# Patient Record
Sex: FEMALE | Race: WHITE | NOT HISPANIC OR LATINO | Employment: OTHER | URBAN - METROPOLITAN AREA
[De-identification: names, ages, dates, MRNs, and addresses within clinical notes are randomized per-mention and may not be internally consistent; named-entity substitution may affect disease eponyms.]

---

## 2017-04-25 ENCOUNTER — OFFICE VISIT (OUTPATIENT)
Dept: PULMONOLOGY | Facility: CLINIC | Age: 82
End: 2017-04-25

## 2017-04-25 VITALS
RESPIRATION RATE: 12 BRPM | HEART RATE: 89 BPM | TEMPERATURE: 97.5 F | OXYGEN SATURATION: 97 % | WEIGHT: 155 LBS | BODY MASS INDEX: 27.46 KG/M2 | SYSTOLIC BLOOD PRESSURE: 122 MMHG | DIASTOLIC BLOOD PRESSURE: 72 MMHG | HEIGHT: 63 IN

## 2017-04-25 DIAGNOSIS — K21.9 GASTROESOPHAGEAL REFLUX DISEASE, ESOPHAGITIS PRESENCE NOT SPECIFIED: ICD-10-CM

## 2017-04-25 DIAGNOSIS — J47.9 BRONCHIECTASIS WITHOUT COMPLICATION (HCC): Primary | ICD-10-CM

## 2017-04-25 DIAGNOSIS — Z87.01 H/O: PNEUMONIA: ICD-10-CM

## 2017-04-25 PROCEDURE — 94010 BREATHING CAPACITY TEST: CPT | Performed by: NURSE PRACTITIONER

## 2017-04-25 PROCEDURE — 99214 OFFICE O/P EST MOD 30 MIN: CPT | Performed by: NURSE PRACTITIONER

## 2017-04-25 RX ORDER — LATANOPROST 50 UG/ML
1 SOLUTION/ DROPS OPHTHALMIC NIGHTLY
COMMUNITY
End: 2017-12-11

## 2017-05-04 PROBLEM — K21.9 GERD (GASTROESOPHAGEAL REFLUX DISEASE): Status: ACTIVE | Noted: 2017-05-04

## 2017-12-11 ENCOUNTER — OFFICE VISIT (OUTPATIENT)
Dept: PULMONOLOGY | Facility: CLINIC | Age: 82
End: 2017-12-11

## 2017-12-11 VITALS
HEIGHT: 63 IN | WEIGHT: 155 LBS | TEMPERATURE: 97.6 F | BODY MASS INDEX: 27.46 KG/M2 | DIASTOLIC BLOOD PRESSURE: 80 MMHG | SYSTOLIC BLOOD PRESSURE: 142 MMHG | OXYGEN SATURATION: 94 % | HEART RATE: 78 BPM

## 2017-12-11 DIAGNOSIS — J47.9 BRONCHIECTASIS WITHOUT COMPLICATION (HCC): Primary | ICD-10-CM

## 2017-12-11 PROCEDURE — 94010 BREATHING CAPACITY TEST: CPT | Performed by: NURSE PRACTITIONER

## 2017-12-11 PROCEDURE — 99214 OFFICE O/P EST MOD 30 MIN: CPT | Performed by: NURSE PRACTITIONER

## 2017-12-11 RX ORDER — BRINZOLAMIDE 1 %
SUSPENSION, DROPS(FINAL DOSAGE FORM)(ML) OPHTHALMIC (EYE)
COMMUNITY
Start: 2017-10-17

## 2017-12-11 RX ORDER — DILTIAZEM HYDROCHLORIDE 120 MG/1
CAPSULE, EXTENDED RELEASE ORAL
COMMUNITY
Start: 2017-10-14 | End: 2017-12-11 | Stop reason: SDUPTHER

## 2017-12-11 RX ORDER — MIRTAZAPINE 45 MG/1
TABLET, FILM COATED ORAL
COMMUNITY
Start: 2017-11-16

## 2017-12-11 NOTE — PROGRESS NOTES
[facility]    CHIEF COMPLAINT    Bronchiectasis  Lifelong nonsmoker  GERD    HISTORY OF PRESENT ILLNESS    Anali Hensley is a 82 y.o.female here today for follow up. I last saw her in the office 4/2017.  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      She's been very resistant to any type of inhalation therapy in the past; mostly due to her h/o glaucoma.  She has alwyas refused immunizations. She has done well overall, remaining active with no recent antibiotic use.     She has done well in the past using doxycycline a couple times a year for any increasing cough or sputum production.    She uses doxycycline a few months ago, she has had no hospitalization or emergency room visit.  She is requesting another prescription.  Currently she denies much of a cough or sputum production and has no wheezing or exertional dyspnea. She has remained active. She has mild MORTENSEN which is unchanged.     She denies indigestion or dysphagia and does have a history of reflux.    She has not had any fevers chills or night sweats, her weight is been stable and she has not required hospitalization or emergency room visit.  No chest pains.      Patient Active Problem List   Diagnosis   • Arthritis   • Bronchiectasis   • Depression   • Diabetes mellitus   • Hypertension   • Obesity   • H/O: pneumonia   • GERD (gastroesophageal reflux disease)       Allergies   Allergen Reactions   • Penicillins    • Stevia [Stevioside]      PACK       Current Outpatient Prescriptions:   •  AZOPT 1 % ophthalmic suspension, , Disp: , Rfl:   •  bimatoprost (LUMIGAN) 0.01 % ophthalmic drops, Apply  to eye., Disp: , Rfl:   •  diltiazem SR (CARDIZEM SR) 120 MG 12 hr capsule, Take  by mouth daily., Disp: , Rfl:   •  LORazepam (ATIVAN) 1 MG tablet, Take  by mouth., Disp: , Rfl:   •  mirtazapine (REMERON) 45 MG tablet, , Disp: , Rfl:   •  Probiotic Product (PROBIOTIC DAILY  "PO), Take  by mouth., Disp: , Rfl:   MEDICATION LIST AND ALLERGIES REVIEWED.    Social History   Substance Use Topics   • Smoking status: Never Smoker   • Smokeless tobacco: None   • Alcohol use No       FAMILY AND SOCIAL HISTORY REVIEWED.    Review of Systems   Constitutional: Negative for activity change, chills, fatigue and fever.   HENT: Positive for postnasal drip. Negative for hearing loss, nosebleeds and sneezing.    Respiratory: Positive for shortness of breath. Negative for apnea, cough, choking, chest tightness, wheezing and stridor.    Cardiovascular: Negative for chest pain, palpitations and leg swelling.        NO INCREASE IN MILD BLE EDEMA; NO CALF TENDERNESS    Gastrointestinal: Negative for abdominal pain, diarrhea and nausea.   Neurological: Negative for dizziness, syncope and light-headedness.   .    /80 (BP Location: Left arm, Patient Position: Sitting, Cuff Size: Adult)  Pulse 78  Temp 97.6 °F (36.4 °C)  Ht 160 cm (62.99\")  Wt 70.3 kg (155 lb)  SpO2 94%  BMI 27.46 kg/m2  Physical Exam   Constitutional: She is oriented to person, place, and time. She appears well-developed. No distress.   HENT:   Head: Normocephalic.   Eyes: Pupils are equal, round, and reactive to light.   Neck: Normal range of motion. No JVD present. No thyromegaly present.   Cardiovascular: Normal rate, regular rhythm and normal heart sounds.  Exam reveals no friction rub.    Mild BLE edema; no venous cords or calf tenderness.    Pulmonary/Chest: Effort normal. No stridor. No respiratory distress. She has no wheezes. She has no rales. She exhibits no tenderness.   Lymphadenopathy:     She has no cervical adenopathy.   Neurological: She is alert and oriented to person, place, and time.   Skin: She is not diaphoretic.   Psychiatric: She has a normal mood and affect. Her behavior is normal. Thought content normal.       RESULTS    No obstruction; MVV normal, restrictive pattern; similar to priors    Chest x-ray PA and " lateral obtained in the office today reveals no acute infiltrates; she has a prominent interstitial pattern as before; with no significant change from prior films; compared to her film from 2014       PROBLEM LIST    Bronchiectasis    GERD    Lifelong nonsmoker    History of bronchitis        DISCUSSION    We did review GERD precautions, to avoid exacerbations, she seems to be doing well with this.      She has a perception for doxycycline on hand in case she needs to use it prior to her next visit.    Otherwise she'll follow-up in 6 months,The patient was told to call and given extensions 132, 112, 104 if needed for an earlier visit if problems arise prior to the scheduled followup.     She was encouraged again to at least get the flu vaccination but she declined, she was told to call us if she changes her mind, as if she does get the flu I cautioned that could be pretty serious causing hospitalization pneumonia etc.    Inga Rouse, APRN  12/11/20172:20 PM  Electronically signed     Please note that portions of this note were completed with a voice recognition program. Efforts were made to edit the dictations, but occasionally words are mistranscribed.      CC: Sagar Mesa MD

## 2018-09-17 ENCOUNTER — OFFICE VISIT (OUTPATIENT)
Dept: PULMONOLOGY | Facility: CLINIC | Age: 83
End: 2018-09-17

## 2018-09-17 VITALS
DIASTOLIC BLOOD PRESSURE: 80 MMHG | HEIGHT: 63 IN | OXYGEN SATURATION: 96 % | WEIGHT: 164 LBS | SYSTOLIC BLOOD PRESSURE: 132 MMHG | HEART RATE: 86 BPM | BODY MASS INDEX: 29.06 KG/M2 | TEMPERATURE: 98.6 F

## 2018-09-17 DIAGNOSIS — J47.9 BRONCHIECTASIS WITHOUT COMPLICATION (HCC): Primary | ICD-10-CM

## 2018-09-17 PROCEDURE — 99213 OFFICE O/P EST LOW 20 MIN: CPT | Performed by: NURSE PRACTITIONER

## 2018-09-17 RX ORDER — DOXYCYCLINE HYCLATE 100 MG/1
100 CAPSULE ORAL 2 TIMES DAILY
Qty: 20 CAPSULE | Refills: 0 | Status: SHIPPED | OUTPATIENT
Start: 2018-09-17 | End: 2019-05-02

## 2018-09-17 NOTE — PROGRESS NOTES
[facility]    CHIEF COMPLAINT    Bronchiectasis    HISTORY OF PRESENT ILLNESS    Anali Hensley is a 83 y.o.female lifetime nonsmoker with bronchiectasis here today for follow-up.    She last saw LC Trevino in December 2017.  She has not had to take any antibiotics since then.  She did develop a productive cough that lasted for about 2 weeks but resolved with flutter valve use.    She has a history of GERD but reports this has been well controlled with no dyspepsia or dysphagia.    She is always refused immunizations in the past as she had a family member that passed away after receiving the flu vaccine.  She has also been hesitant to try any inhaled therapy.      Patient Active Problem List   Diagnosis   • Arthritis   • Bronchiectasis (CMS/HCC)   • Depression   • Diabetes mellitus (CMS/HCC)   • Hypertension   • Obesity   • H/O: pneumonia   • GERD (gastroesophageal reflux disease)       Allergies   Allergen Reactions   • Penicillins    • Stevia [Stevioside]      PACK       Current Outpatient Prescriptions:   •  AZOPT 1 % ophthalmic suspension, , Disp: , Rfl:   •  bimatoprost (LUMIGAN) 0.01 % ophthalmic drops, Apply  to eye., Disp: , Rfl:   •  diltiazem SR (CARDIZEM SR) 120 MG 12 hr capsule, Take  by mouth daily., Disp: , Rfl:   •  LORazepam (ATIVAN) 1 MG tablet, Take  by mouth., Disp: , Rfl:   •  mirtazapine (REMERON) 45 MG tablet, , Disp: , Rfl:   •  Probiotic Product (PROBIOTIC DAILY PO), Take  by mouth., Disp: , Rfl:   •  doxycycline (VIBRAMYCIN) 100 MG capsule, Take 1 capsule by mouth 2 (Two) Times a Day., Disp: 20 capsule, Rfl: 0  MEDICATION LIST AND ALLERGIES REVIEWED.    Social History   Substance Use Topics   • Smoking status: Never Smoker   • Smokeless tobacco: Not on file   • Alcohol use No       FAMILY AND SOCIAL HISTORY REVIEWED.    Review of Systems   Constitutional: Negative.    HENT: Negative.    Eyes: Negative.    Respiratory: Positive for cough.    Cardiovascular: Negative.   "  Gastrointestinal: Negative.    Musculoskeletal: Positive for arthralgias and gait problem.   Skin: Negative.    .    /80   Pulse 86   Temp 98.6 °F (37 °C)   Ht 160 cm (63\")   Wt 74.4 kg (164 lb)   SpO2 96% Comment: RA @ rest  BMI 29.05 kg/m²       There is no immunization history on file for this patient.    Physical Exam   Constitutional: She is oriented to person, place, and time. She appears well-developed.   HENT:   Head: Normocephalic and atraumatic.   Neck: Neck supple.   Cardiovascular: Normal rate and regular rhythm.    No murmur heard.  Pulmonary/Chest: Effort normal and breath sounds normal. No stridor. No respiratory distress. She has no wheezes. She has no rales.   Musculoskeletal:   Ambulating with cane   Lymphadenopathy:     She has no cervical adenopathy.   Neurological: She is alert and oriented to person, place, and time.   Skin: Skin is warm and dry.   Psychiatric: She has a normal mood and affect. Her behavior is normal.   Vitals reviewed.        RESULTS    Chest x-ray PA and lateral obtained in the office today reveals no acute infiltrates; she has a prominent interstitial pattern as before; with no significant change from prior films; compared to her film from Dec 2017.       PROBLEM LIST    Problem List Items Addressed This Visit        Respiratory    Bronchiectasis (CMS/HCC) - Primary    Overview     Prior CT scans of the chest (was referred in 1992 to our office; saw Dr. Sal for      this reason.) showing a patchy infiltrate in the right middle lobe which resembled a      masslike consolidation.Improved after abx, with repeat CT in [facility]/ followed in      1993 by her PCP; she was to be referred back for a bronch if      the cough/sputum persisted.         Relevant Orders    XR Chest PA & Lateral            DISCUSSION    She has done well from a pulmonary standpoint with no need for antibiotics over the last year.  She does like to have a prescription for doxycycline " on hand in the event of exacerbation.  I have provided her with a written one today.  I encouraged flutter valve use as needed for mucociliary clearance.  She is not interested in any immunizations today.    She'll follow up in 9 months or sooner if needed.    I spent 15 minutes with the patient. I spent > 50% percent of this time counseling and discussing diagnosis, diagnostic testing and current status.    Zulma Don, LC  09/17/201812:30 PM  Electronically signed     Please note that portions of this note were completed with a voice recognition program. Efforts were made to edit the dictations, but occasionally words are mistranscribed.      CC: Sagar Mesa MD

## 2019-04-23 ENCOUNTER — DOCUMENTATION (OUTPATIENT)
Dept: PULMONOLOGY | Facility: CLINIC | Age: 84
End: 2019-04-23

## 2019-04-23 NOTE — PROGRESS NOTES
Pt called requesting to schedule an apt due to having productive cough w/ sputum for about 2 weeks. Pt denies any chest pain/nausea/fever. Pt last seen on 9/17/2019. Due to no openings this week, pt advised to contact PCP for further evaluation and we can put her on the waiting list to be called. Pt verbalized understanding.

## 2019-05-02 ENCOUNTER — OFFICE VISIT (OUTPATIENT)
Dept: PULMONOLOGY | Facility: CLINIC | Age: 84
End: 2019-05-02

## 2019-05-02 VITALS
SYSTOLIC BLOOD PRESSURE: 160 MMHG | TEMPERATURE: 98.2 F | WEIGHT: 171.4 LBS | OXYGEN SATURATION: 97 % | BODY MASS INDEX: 30.37 KG/M2 | DIASTOLIC BLOOD PRESSURE: 90 MMHG | HEART RATE: 81 BPM | HEIGHT: 63 IN

## 2019-05-02 DIAGNOSIS — J47.9 BRONCHIECTASIS WITHOUT COMPLICATION (HCC): Primary | ICD-10-CM

## 2019-05-02 DIAGNOSIS — K21.9 GASTROESOPHAGEAL REFLUX DISEASE, ESOPHAGITIS PRESENCE NOT SPECIFIED: ICD-10-CM

## 2019-05-02 PROCEDURE — 99213 OFFICE O/P EST LOW 20 MIN: CPT | Performed by: NURSE PRACTITIONER

## 2019-05-02 RX ORDER — DILTIAZEM HYDROCHLORIDE 120 MG/1
TABLET, FILM COATED ORAL
COMMUNITY
Start: 2006-05-02

## 2019-05-02 RX ORDER — DOXYCYCLINE HYCLATE 100 MG/1
100 CAPSULE ORAL 2 TIMES DAILY
Qty: 20 CAPSULE | Refills: 0 | Status: SHIPPED | OUTPATIENT
Start: 2019-05-02

## 2019-05-02 NOTE — PROGRESS NOTES
[facility]    CHIEF COMPLAINT    Cough and congestion    Subjective   HISTORY OF PRESENT ILLNESS    Anali Hensley is a 83 y.o.female here today for routine follow-up on her bronchiectasis she was last seen in the office in September 2018 by Ms. Don prior to that she been followed by Inga Rouse.  It appears over the last few year she has had a rare episode of exacerbation.    She does have a history of a restrictive deficit secondary to her bronchiectasis as well as to some kyphosis.  Her last PFTs were in December 2017 with an FVC of 67%.    Routinely she uses a flutter valve as needed to help with secretion clearance.  She does not use any nebulizers or inhalers.    She has been having some worsening cough and congestion the last 2 weeks.  She is having a thick yellow-tinged sputum.  She denies any fevers or chills.  She is having some wheezing but no significant shortness of air.    She also cares for her ill  at home, she is his primary care provider.      Patient Active Problem List   Diagnosis   • Arthritis   • Bronchiectasis (CMS/[facility])   • Depression   • Diabetes mellitus (CMS/[facility])   • Hypertension   • Obesity   • H/O: pneumonia   • GERD (gastroesophageal reflux disease)       Allergies   Allergen Reactions   • Penicillins Swelling   • Stevia [Stevioside] Swelling     PACK       Current Outpatient Medications:   •  AZOPT 1 % ophthalmic suspension, , Disp: , Rfl:   •  bimatoprost (LUMIGAN) 0.01 % ophthalmic drops, Apply  to eye., Disp: , Rfl:   •  diltiaZEM (CARDIZEM) 120 MG tablet, Take one by mouth per day, Disp: , Rfl:   •  diltiazem SR (CARDIZEM SR) 120 MG 12 hr capsule, Take  by mouth daily., Disp: , Rfl:   •  LORazepam (ATIVAN) 1 MG tablet, Take  by mouth., Disp: , Rfl:   •  mirtazapine (REMERON) 45 MG tablet, , Disp: , Rfl:   •  Probiotic Product (PROBIOTIC DAILY PO), Take  by mouth., Disp: , Rfl:   •  doxycycline (VIBRAMYCIN) 100 MG capsule, Take 1 capsule by mouth 2 (Two)  "Times a Day., Disp: 20 capsule, Rfl: 0  MEDICATION LIST AND ALLERGIES REVIEWED.    Social History     Tobacco Use   • Smoking status: Never Smoker   Substance Use Topics   • Alcohol use: No   • Drug use: No       FAMILY AND SOCIAL HISTORY REVIEWED.    Review of Systems   Constitutional: Positive for fatigue. Negative for chills, fever and unexpected weight change.   HENT: Positive for congestion. Negative for nosebleeds, postnasal drip, rhinorrhea, sinus pressure and trouble swallowing.    Respiratory: Positive for cough and wheezing. Negative for chest tightness and shortness of breath.    Cardiovascular: Negative for chest pain and leg swelling.   Gastrointestinal: Negative for abdominal pain, constipation, diarrhea, nausea and vomiting.   Genitourinary: Negative for dysuria, frequency, hematuria and urgency.   Musculoskeletal: Negative for myalgias.   Neurological: Negative for dizziness, weakness, numbness and headaches.   All other systems reviewed and are negative.  .  Objective   /90 (BP Location: Right arm, Patient Position: Sitting, Cuff Size: Adult)   Pulse 81   Temp 98.2 °F (36.8 °C)   Ht 160 cm (63\")   Wt 77.7 kg (171 lb 6.4 oz)   SpO2 97% Comment: resting at room air  BMI 30.36 kg/m²       There is no immunization history on file for this patient.    Physical Exam   Constitutional: She is oriented to person, place, and time. She appears well-developed and well-nourished.   HENT:   Head: Normocephalic and atraumatic.   Eyes: EOM are normal. Pupils are equal, round, and reactive to light.   Neck: Normal range of motion. Neck supple.   Cardiovascular: Normal rate and regular rhythm.   No murmur heard.  Pulmonary/Chest: Effort normal. No respiratory distress. She has no wheezes. She has rales.   Abdominal: Soft. Bowel sounds are normal. She exhibits no distension.   Musculoskeletal: Normal range of motion. She exhibits no edema.   Neurological: She is alert and oriented to person, place, and " time.   Skin: Skin is warm and dry. No erythema.   Psychiatric: She has a normal mood and affect. Her behavior is normal.   Vitals reviewed.        RESULTS    PA and lateral chest x-ray done in the office today reviewed by me.  Compared to September 2018.  Scoliosis present with rotation to the right.  Chronic right middle lobe lower lobe interstitial changes.  No acute infiltrates or effusions.    Assessment/Plan     PROBLEM LIST    Problem List Items Addressed This Visit        Respiratory    Bronchiectasis (CMS/HCC) - Primary    Overview     Prior CT scans of the chest (was referred in 1992 to our office; saw Dr. Sal for      this reason.) showing a patchy infiltrate in the right middle lobe which resembled a      masslike consolidation.Improved after abx, with repeat CT in [facility]/ followed in      1993 by her PCP; she was to be referred back for a bronch if      the cough/sputum persisted.         Relevant Orders    XR Chest PA & Lateral       Digestive    GERD (gastroesophageal reflux disease)            DISCUSSION    She does appear to have an acute exacerbation of her bronchiectasis.  She she does very well with the use of some flutter valve as needed.  When had caught her in a course of doxycycline and asked her to start taking some Mucinex.  To have some samples here in the office and gave her some.    At this time we will hold off on nebulizer to use with the flutter valve.  If she is not able to clear her secretions very well can always add that on.    Follow-up in 5 to 7 days if no improvement otherwise in 6 months with full PFTs.    I spent 15 minutes with the patient. I spent > 50% percent of this time counseling and discussing diagnostic testing, evaluation, current status, treatment options and management.    Michelle Raymundo, LC  05/02/201912:59 PM  Electronically signed     Please note that portions of this note were completed with a voice recognition program. Efforts were made to edit  the dictations, but occasionally words are mistranscribed.      CC: Sagar Mesa MD

## 2025-01-10 NOTE — PROGRESS NOTES
Subjective:     Encounter Date:01/13/2025    Primary Care Physician: Sagar Mesa MD      Patient ID: Anali Hensley is a 89 y.o. female.    Chief Complaint:Congestive Heart Failure and Heart Murmur    PROBLEM LIST:  Heart murmur  Bronchiectasis   Anxiety/depression  Surgeries:  Hysterectomy         Allergies   Allergen Reactions    Penicillins Swelling    Stevia [Stevioside] Swelling     PACK         Current Outpatient Medications:     acetaminophen (Tylenol 8 Hour) 650 MG 8 hr tablet, Every 8 (Eight) Hours., Disp: , Rfl:     aspirin 81 MG EC tablet, Take 1 tablet by mouth Daily., Disp: , Rfl:     AZOPT 1 % ophthalmic suspension, , Disp: , Rfl:     bimatoprost (LUMIGAN) 0.01 % ophthalmic drops, Apply  to eye., Disp: , Rfl:     brimonidine-timolol (Combigan) 0.2-0.5 % ophthalmic solution, , Disp: , Rfl:     Diclofenac Sodium (VOLTAREN) 1 % gel gel, APPLY 2 GRAMS TO THE AFFECTED AREA(S) BY TOPICAL ROUTE 4 TIMES PER DAY, Disp: , Rfl:     diltiazem SR (CARDIZEM SR) 120 MG 12 hr capsule, Take  by mouth daily., Disp: , Rfl:     docusate sodium (Colace) 100 MG capsule, Daily., Disp: , Rfl:     donepezil (ARICEPT) 10 MG tablet, Take 1 tablet by mouth Daily., Disp: , Rfl:     LORazepam (ATIVAN) 1 MG tablet, Take  by mouth., Disp: , Rfl:     metFORMIN (GLUCOPHAGE) 500 MG tablet, Take 1 tablet by mouth 2 (Two) Times a Day., Disp: , Rfl:     mirtazapine (REMERON) 45 MG tablet, , Disp: , Rfl:     naproxen sodium (Aleve) 220 MG tablet, Every 12 (Twelve) Hours., Disp: , Rfl:     promethazine (PHENERGAN) 12.5 MG tablet, TAKE 1 OR 2 TABLETS EVERY 8 HOURS, Disp: , Rfl:     spironolactone (ALDACTONE) 25 MG tablet, Take 1 tablet by mouth Daily., Disp: , Rfl:     zinc gluconate 50 MG tablet, Take 1 tablet by mouth Daily., Disp: , Rfl:         History of Present Illness    Patient is an 89-year-old  female who we are seeing today for further evaluation of murmur and elevated BNP.  Patient has no previous history of any  cardiac issues.  Patient is accompanied by her family today who provides most of the history.  Notes that she is starting to have the beginnings of dementia.  Patient has some occasional atypical chest discomfort.  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  Patient since being started on metformin and having a high-protein diet has had some weight loss which has led to the discontinuation of her carvedilol.  Blood pressure is still running on the lower side on diltiazem.  No loss of consciousness.  Given notation of murmur on exam and mildly elevated BNP at time of lab work she was referred here for further evaluation.  She has not had a recent echocardiogram.    The following portions of the patient's history were reviewed and updated as appropriate: allergies, current medications, past family history, past medical history, past social history, past surgical history and problem list.    Family History   Problem Relation Age of Onset    Hypertension Mother     Osteoporosis Mother     Heart disease Father     No Known Problems Brother        Social History     Tobacco Use    Smoking status: Never    Smokeless tobacco: Never   Vaping Use    Vaping status: Never Used    Passive vaping exposure: Yes   Substance Use Topics    Alcohol use: No    Drug use: No         Review of Systems   Constitutional: Positive for weight loss. Negative for fever and malaise/fatigue.   HENT:  Positive for hearing loss. Negative for nosebleeds.    Eyes:  Negative for redness and visual disturbance.   Cardiovascular:  Positive for chest pain. Negative for orthopnea, palpitations and paroxysmal nocturnal dyspnea.   Respiratory:  Positive for cough. Negative for snoring, sputum production and wheezing.    Hematologic/Lymphatic: Negative for bleeding problem.   Skin:  Negative for flushing, itching and rash.   Musculoskeletal:  Positive for arthritis. Negative for falls, joint pain and muscle cramps.  "  Gastrointestinal:  Positive for change in bowel habit. Negative for abdominal pain, diarrhea, heartburn, nausea and vomiting.   Genitourinary:  Negative for hematuria.   Neurological:  Positive for dizziness and loss of balance. Negative for excessive daytime sleepiness, headaches, tremors and weakness.   Psychiatric/Behavioral:  Positive for memory loss. Negative for substance abuse. The patient is not nervous/anxious.           Objective:   /52 (BP Location: Right arm, Patient Position: Sitting)   Pulse 65   Ht 152.4 cm (60\")   Wt 52.2 kg (115 lb)   SpO2 98%   BMI 22.46 kg/m²         Vitals reviewed.   Constitutional:       Appearance: Healthy appearance. Well-developed and not in distress.   Eyes:      Conjunctiva/sclera: Conjunctivae normal.      Pupils: Pupils are equal, round, and reactive to light.   HENT:      Head: Normocephalic and atraumatic.    Mouth/Throat:      Pharynx: Oropharynx is clear.   Neck:      Thyroid: Thyroid normal. No thyromegaly.      Vascular: Normal carotid pulses. No carotid bruit or JVD. JVD normal.      Lymphadenopathy: No cervical adenopathy.   Pulmonary:      Effort: No respiratory distress.      Breath sounds: No wheezing. No rales.   Chest:      Chest wall: Not tender to palpatation.   Cardiovascular:      Normal rate. Regular rhythm.      Murmurs: There is a grade 3/6 systolic murmur at the URSB and LLSB, radiating to the neck.      No gallop.    Pulses:     Carotid: 2+ bilaterally.     Dorsalis pedis: 2+ bilaterally.     Posterior tibial: 2+ bilaterally.  Edema:     Peripheral edema absent.   Abdominal:      General: There is no distension or abdominal bruit.      Palpations: There is no abdominal mass.      Tenderness: There is no abdominal tenderness. There is no rebound.   Musculoskeletal:         General: No tenderness or deformity.      Extremities: No clubbing present.Skin:     General: Skin is warm and dry. There is no cyanosis.      Findings: No rash. "   Neurological:      Mental Status: Alert, oriented to person, place, and time and oriented to person, place and time.           ECG 12 Lead    Date/Time: 1/13/2025 10:17 AM  Performed by: Hudson Marley MD    Authorized by: Hudson Marley MD  Comparison: not compared with previous ECG   Previous ECG: no previous ECG available  Rhythm: sinus rhythm  Conduction: right bundle branch block and 1st degree AV block                Assessment:   Assessment & Plan      Diagnoses and all orders for this visit:    1. Heart murmur (Primary)  -     ECG 12 Lead      1.  Murmur consistent with aortic stenosis, significant.  2.  New hypotension, symptomatic with intermittent dizziness  3.  Elevated BNP, but no evidence of heart failure clinically.  4.  New onset dementia, mild    Recommendations:  1.  Check echocardiogram.  2.  If patient has critical AS, she is not a candidate for open procedures.  Will discuss TAVR versus conservative therapy with patient/family  3.  Discontinue diltiazem  4 revisit after the echocardiogram       Em PLATT scribed portions of this dictation for Dr. Hudson Marley.     Advance Care Planning   ACP discussion was held with the patient during this visit. Patient has an advance directive (not in EMR), copy requested.      I have seen and examined the patient, I have reviewed the note, discussed the case with the advance practice clinician, made necessary changes and I agree with the final note.    Hudson Marley MD  01/13/25  11:16 EST      Dictated utilizing Dragon dictation

## 2025-01-13 ENCOUNTER — OFFICE VISIT (OUTPATIENT)
Dept: CARDIOLOGY | Facility: CLINIC | Age: 89
End: 2025-01-13
Payer: MEDICARE

## 2025-01-13 ENCOUNTER — HOSPITAL ENCOUNTER (OUTPATIENT)
Dept: CARDIOLOGY | Facility: HOSPITAL | Age: 89
Discharge: HOME | End: 2025-01-13
Admitting: INTERNAL MEDICINE
Payer: MEDICARE

## 2025-01-13 VITALS
WEIGHT: 115 LBS | HEART RATE: 65 BPM | BODY MASS INDEX: 22.58 KG/M2 | OXYGEN SATURATION: 98 % | SYSTOLIC BLOOD PRESSURE: 104 MMHG | HEIGHT: 60 IN | DIASTOLIC BLOOD PRESSURE: 52 MMHG

## 2025-01-13 VITALS — WEIGHT: 115.08 LBS | HEIGHT: 60 IN | BODY MASS INDEX: 22.59 KG/M2

## 2025-01-13 DIAGNOSIS — I10 HYPERTENSION, UNSPECIFIED TYPE: ICD-10-CM

## 2025-01-13 DIAGNOSIS — R01.1 HEART MURMUR: ICD-10-CM

## 2025-01-13 DIAGNOSIS — I10 HYPERTENSION, UNSPECIFIED TYPE: Primary | ICD-10-CM

## 2025-01-13 DIAGNOSIS — R01.1 HEART MURMUR: Primary | ICD-10-CM

## 2025-01-13 PROCEDURE — 1160F RVW MEDS BY RX/DR IN RCRD: CPT | Performed by: INTERNAL MEDICINE

## 2025-01-13 PROCEDURE — 93000 ELECTROCARDIOGRAM COMPLETE: CPT | Performed by: INTERNAL MEDICINE

## 2025-01-13 PROCEDURE — 99204 OFFICE O/P NEW MOD 45 MIN: CPT | Performed by: INTERNAL MEDICINE

## 2025-01-13 PROCEDURE — 1159F MED LIST DOCD IN RCRD: CPT | Performed by: INTERNAL MEDICINE

## 2025-01-13 PROCEDURE — 93306 TTE W/DOPPLER COMPLETE: CPT

## 2025-01-13 RX ORDER — DOCUSATE SODIUM 100 MG/1
CAPSULE, LIQUID FILLED ORAL EVERY 24 HOURS
COMMUNITY
Start: 2024-12-23

## 2025-01-13 RX ORDER — ASPIRIN 81 MG/1
1 TABLET ORAL DAILY
COMMUNITY

## 2025-01-13 RX ORDER — BRIMONIDINE TARTRATE AND TIMOLOL MALEATE 2; 5 MG/ML; MG/ML
SOLUTION OPHTHALMIC
COMMUNITY

## 2025-01-13 RX ORDER — SPIRONOLACTONE 25 MG/1
1 TABLET ORAL DAILY
COMMUNITY

## 2025-01-13 RX ORDER — ZINC GLUCONATE 50 MG
1 TABLET ORAL DAILY
COMMUNITY

## 2025-01-13 RX ORDER — SENNOSIDES 8.6 MG
CAPSULE ORAL EVERY 8 HOURS SCHEDULED
COMMUNITY

## 2025-01-13 RX ORDER — DONEPEZIL HYDROCHLORIDE 10 MG/1
10 TABLET, FILM COATED ORAL DAILY
COMMUNITY

## 2025-01-13 RX ORDER — NAPROXEN SODIUM 220 MG/1
TABLET, FILM COATED ORAL EVERY 12 HOURS SCHEDULED
COMMUNITY

## 2025-01-13 RX ORDER — PROMETHAZINE HYDROCHLORIDE 12.5 MG/1
TABLET ORAL
COMMUNITY

## 2025-01-14 ENCOUNTER — TELEPHONE (OUTPATIENT)
Dept: CARDIOLOGY | Facility: CLINIC | Age: 89
End: 2025-01-14

## 2025-01-14 LAB
AV MEAN PRESS GRAD SYS DOP V1V2: 53 MMHG
AV VMAX SYS DOP: 480 CM/SEC
BH CV ECHO MEAS - AI P1/2T: 326.3 MSEC
BH CV ECHO MEAS - AO MAX PG: 92.3 MMHG
BH CV ECHO MEAS - AO ROOT DIAM: 3.2 CM
BH CV ECHO MEAS - AO V2 VTI: 107.7 CM
BH CV ECHO MEAS - AVA(I,D): 1.98 CM2
BH CV ECHO MEAS - EDV(CUBED): 54.9 ML
BH CV ECHO MEAS - EDV(MOD-SP2): 47 ML
BH CV ECHO MEAS - EDV(MOD-SP4): 48.8 ML
BH CV ECHO MEAS - EF(MOD-SP2): 67.9 %
BH CV ECHO MEAS - EF(MOD-SP4): 73 %
BH CV ECHO MEAS - ESV(CUBED): 15.6 ML
BH CV ECHO MEAS - ESV(MOD-SP2): 15.1 ML
BH CV ECHO MEAS - ESV(MOD-SP4): 13.2 ML
BH CV ECHO MEAS - FS: 34.2 %
BH CV ECHO MEAS - IVS/LVPW: 1 CM
BH CV ECHO MEAS - IVSD: 1.2 CM
BH CV ECHO MEAS - LA DIMENSION: 3.5 CM
BH CV ECHO MEAS - LAT PEAK E' VEL: 5.8 CM/SEC
BH CV ECHO MEAS - LV DIASTOLIC VOL/BSA (35-75): 33.1 CM2
BH CV ECHO MEAS - LV MASS(C)D: 153.2 GRAMS
BH CV ECHO MEAS - LV MAX PG: 35.5 MMHG
BH CV ECHO MEAS - LV MEAN PG: 21 MMHG
BH CV ECHO MEAS - LV SYSTOLIC VOL/BSA (12-30): 8.9 CM2
BH CV ECHO MEAS - LV V1 MAX: 298 CM/SEC
BH CV ECHO MEAS - LV V1 VTI: 67.8 CM
BH CV ECHO MEAS - LVIDD: 3.8 CM
BH CV ECHO MEAS - LVIDS: 2.5 CM
BH CV ECHO MEAS - LVOT AREA: 3.1 CM2
BH CV ECHO MEAS - LVOT DIAM: 2 CM
BH CV ECHO MEAS - LVPWD: 1.2 CM
BH CV ECHO MEAS - MED PEAK E' VEL: 5.7 CM/SEC
BH CV ECHO MEAS - MR MAX PG: 163 MMHG
BH CV ECHO MEAS - MR MAX VEL: 630.5 CM/SEC
BH CV ECHO MEAS - MR MEAN PG: 141 MMHG
BH CV ECHO MEAS - MR MEAN VEL: 558 CM/SEC
BH CV ECHO MEAS - MR VTI: 206 CM
BH CV ECHO MEAS - MV A MAX VEL: 248 CM/SEC
BH CV ECHO MEAS - MV DEC SLOPE: 603 CM/SEC2
BH CV ECHO MEAS - MV DEC TIME: 0.25 SEC
BH CV ECHO MEAS - MV E MAX VEL: 180 CM/SEC
BH CV ECHO MEAS - MV E/A: 0.73
BH CV ECHO MEAS - MV MAX PG: 25.7 MMHG
BH CV ECHO MEAS - MV MEAN PG: 12.5 MMHG
BH CV ECHO MEAS - MV P1/2T: 99.1 MSEC
BH CV ECHO MEAS - MV V2 VTI: 77.5 CM
BH CV ECHO MEAS - MVA(P1/2T): 2.22 CM2
BH CV ECHO MEAS - MVA(VTI): 2.7 CM2
BH CV ECHO MEAS - PA ACC TIME: 0.08 SEC
BH CV ECHO MEAS - PA V2 MAX: 132 CM/SEC
BH CV ECHO MEAS - PI END-D VEL: 167 CM/SEC
BH CV ECHO MEAS - RAP SYSTOLE: 3 MMHG
BH CV ECHO MEAS - RVSP: 43 MMHG
BH CV ECHO MEAS - SV(LVOT): 213 ML
BH CV ECHO MEAS - SV(MOD-SP2): 31.9 ML
BH CV ECHO MEAS - SV(MOD-SP4): 35.6 ML
BH CV ECHO MEAS - SVI(LVOT): 144.4 ML/M2
BH CV ECHO MEAS - SVI(MOD-SP2): 21.6 ML/M2
BH CV ECHO MEAS - SVI(MOD-SP4): 24.1 ML/M2
BH CV ECHO MEAS - TAPSE (>1.6): 1.87 CM
BH CV ECHO MEAS - TR MAX PG: 38.1 MMHG
BH CV ECHO MEAS - TR MAX VEL: 308.3 CM/SEC
BH CV ECHO MEASUREMENTS AVERAGE E/E' RATIO: 31.3
BH CV XLRA - RV BASE: 2.7 CM
BH CV XLRA - RV LENGTH: 6.7 CM
BH CV XLRA - RV MID: 2.2 CM
BH CV XLRA - TDI S': 12.1 CM/SEC
LEFT ATRIUM VOLUME INDEX: 39.4 ML/M2
LV EF 2D ECHO EST: 70 %
LV EF BIPLANE MOD: 69.6 %

## 2025-01-14 NOTE — TELEPHONE ENCOUNTER
Caller: JORDAN    Relationship: PCP    Best call back number: [phone]    What form or medical record are you requesting: OFFICE NOTE FROM 1/13/25    Who is requesting this form or medical record from you: [facility] PCP    How would you like to receive the form or medical records (pick-up, mail, fax): FAX  If fax, what is the fax number: [phone]      Timeframe paperwork needed: ASAP

## 2025-01-16 ENCOUNTER — TELEPHONE (OUTPATIENT)
Dept: CARDIOLOGY | Facility: CLINIC | Age: 89
End: 2025-01-16
Payer: MEDICARE

## 2025-01-16 NOTE — TELEPHONE ENCOUNTER
Spoke with daughter, advised of below.  Reminded of upcoming appt on Feb 10----- Message from Hudson Marley sent at 1/15/2025  9:24 AM EST -----  Patient with severe aortic stenosis and at least moderate mitral regurgitation.  Have her schedule a revisit in the next few weeks to discuss our options, involved TAVR versus simple conservative therapy.

## 2025-01-30 ENCOUNTER — OFFICE VISIT (OUTPATIENT)
Dept: CARDIOLOGY | Facility: CLINIC | Age: 89
End: 2025-01-30
Payer: MEDICARE

## 2025-01-30 VITALS
OXYGEN SATURATION: 97 % | HEIGHT: 60 IN | WEIGHT: 116.8 LBS | HEART RATE: 71 BPM | BODY MASS INDEX: 22.93 KG/M2 | DIASTOLIC BLOOD PRESSURE: 58 MMHG | SYSTOLIC BLOOD PRESSURE: 118 MMHG

## 2025-01-30 DIAGNOSIS — I34.2 NONRHEUMATIC MITRAL VALVE STENOSIS: ICD-10-CM

## 2025-01-30 DIAGNOSIS — I35.0 AORTIC STENOSIS, SEVERE: Primary | ICD-10-CM

## 2025-01-30 PROCEDURE — 1160F RVW MEDS BY RX/DR IN RCRD: CPT | Performed by: INTERNAL MEDICINE

## 2025-01-30 PROCEDURE — 99214 OFFICE O/P EST MOD 30 MIN: CPT | Performed by: INTERNAL MEDICINE

## 2025-01-30 PROCEDURE — 1159F MED LIST DOCD IN RCRD: CPT | Performed by: INTERNAL MEDICINE

## 2025-01-30 RX ORDER — METFORMIN HYDROCHLORIDE 500 MG/1
1 TABLET, EXTENDED RELEASE ORAL EVERY 12 HOURS SCHEDULED
COMMUNITY
Start: 2025-01-10

## 2025-01-30 RX ORDER — CARVEDILOL 3.12 MG/1
1 TABLET ORAL EVERY 12 HOURS SCHEDULED
COMMUNITY
Start: 2024-12-13 | End: 2025-01-30

## 2025-01-30 NOTE — H&P (VIEW-ONLY)
[facility] Cardiology    Patient ID: Anali Hensley is a 89 y.o. female.  : 1935   Contact: [phone]    Encounter date: 2025    PCP: Sagar Mesa MD      Chief complaint:   Chief Complaint   Patient presents with    Heart Murmur     NP - TAVR Consult       Problem List:  Aortic stenosis  Echocardiogram 2025: LVEF 70%, mild LVH, mean aortic valve gradient 53 mmHg.  Systolic anterior motion of the mitral valve.  Moderate to severe mitral regurgitation.  Bronchiectasis  Anxiety/depression  Dementia, mild  Surgeries  Hysterectomy        Allergies   Allergen Reactions    Penicillins Swelling    Stevia [Stevioside] Swelling     PACK       Current Medications:    Current Outpatient Medications:     acetaminophen (Tylenol 8 Hour) 650 MG 8 hr tablet, Every 8 (Eight) Hours., Disp: , Rfl:     aspirin 81 MG EC tablet, Take 1 tablet by mouth Daily., Disp: , Rfl:     AZOPT 1 % ophthalmic suspension, , Disp: , Rfl:     bimatoprost (LUMIGAN) 0.01 % ophthalmic drops, Apply  to eye., Disp: , Rfl:     brimonidine-timolol (Combigan) 0.2-0.5 % ophthalmic solution, , Disp: , Rfl:     Diclofenac Sodium (VOLTAREN) 1 % gel gel, APPLY 2 GRAMS TO THE AFFECTED AREA(S) BY TOPICAL ROUTE 4 TIMES PER DAY (Patient taking differently: As Needed.), Disp: , Rfl:     docusate sodium (Colace) 100 MG capsule, Daily., Disp: , Rfl:     donepezil (ARICEPT) 10 MG tablet, Take 1 tablet by mouth Daily., Disp: , Rfl:     LORazepam (ATIVAN) 1 MG tablet, Take  by mouth. (Patient taking differently: Take 0.5 tablets by mouth Every Night.), Disp: , Rfl:     metFORMIN ER (GLUCOPHAGE-XR) 500 MG 24 hr tablet, Take 1 tablet by mouth Every 12 (Twelve) Hours., Disp: , Rfl:     mirtazapine (REMERON) 45 MG tablet, , Disp: , Rfl:     naproxen sodium (Aleve) 220 MG tablet, Every 12 (Twelve) Hours. (Patient taking differently: Take 1 tablet by mouth As Needed.), Disp: , Rfl:     promethazine (PHENERGAN) 12.5 MG  tablet, TAKE 1 OR 2 TABLETS EVERY 8 HOURS (Patient taking differently: Take 1 tablet by mouth As Needed.), Disp: , Rfl:     spironolactone (ALDACTONE) 25 MG tablet, Take 1 tablet by mouth Daily., Disp: , Rfl:     zinc gluconate 50 MG tablet, Take 1 tablet by mouth Daily., Disp: , Rfl:     metFORMIN (GLUCOPHAGE) 500 MG tablet, Take 1 tablet by mouth 2 (Two) Times a Day. (Patient not taking: Reported on 1/30/2025), Disp: , Rfl:     HPI    Anali Henslye is a 89 y.o. female who presents today for TAVR evaluation.  The patient is accompanied by her daughter today who takes very good care of her mother.  The aortic stenosis murmur was noted when her mother presented to the [facility] for evaluation of constipation.  She has not had any symptoms of shortness of breath chest pain or syncope.  On reflecting her daughter does note that she becomes a little fatigued at bath time and with showers.  The patient does not do much at home.  She sits a lot but she can walk with 2 canes and when they go to [place] she does walk down to the beach.      Over the last 2 years she has had some weight loss.  This initially started with metformin.  She has lost about 30 pounds.  She does not want to eat much now and her daughter is having to work with her to ensure adequate nutritional intake.    Her blood pressure was low recently and her carvedilol and diltiazem have been discontinued.  Her daughter has noticed that she had some lower extremity edema.  The patient told her daughter long ago that she did not want to have any kind of an open chest procedure like her  had.  She is having some memory issues and had some difficulty comprehending the conversation during today's visit.      The following portions of the patient's history were reviewed and updated as appropriate: allergies, current medications and problem list.    Pertinent positives as listed in the HPI.  All other systems reviewed are  "negative.         Vitals:    01/30/25 1356   BP: 118/58   BP Location: Right arm   Patient Position: Sitting   Cuff Size: Adult   Pulse: 71   SpO2: 97%   Weight: 53 kg (116 lb 12.8 oz)   Height: 152.4 cm (60\")       Physical Exam:  General: Alert  Neck: Jugular venous pressure is within normal limits. Carotids have normal upstrokes without bruits.   Cardiovascular: Heart has a nondisplaced focal PMI. Regular rate and rhythm. 2-3/6 SE murmur, no gallop or rub.  Lungs: A few basilar crackles are heard bilaterally.. Equal expansion is noted.   Extremities: Show no edema.  Skin: Warm and dry.  Neurologic: Nonfocal.         Advance Care Planning   ACP discussion was held with the patient during this visit. Patient has an advance directive (not in EMR), copy requested.       Procedures      Assessment:    ICD-10-CM ICD-9-CM   1. Aortic stenosis, severe  I35.0 424.1   2. Nonrheumatic mitral valve stenosis  I34.2 424.0     The transcatheter aortic valve was shown to the patient and her daughter.  The patient is not interested in an open procedure.  The risks of the transcatheter aortic valve were discussed with the patient and her daughter including a fairly high likelihood of pacemaker implantation due to a baseline right bundle branch block, risk of stroke, risk of vascular damage and bleeding risk.      Plan:  I think a GABE is important to further evaluate the patient's degree of mitral stenosis.  As she is not interested in an open chest procedure and she does have some signs of dementia I think aortic valve replacement is reasonable with medical management of the mitral valve.  She and her daughter understand that she is at high risk for pacemaker with a right bundle branch block at baseline.  Left heart catheterization with Dr. Marley.  Will schedule try to schedule the heart cath and the GAEB on the same day.  CT surgery evaluation and CT scan will be scheduled.  Continue all other current medications.  F/up in after " procedure sooner if needed.      Shyanne Macdonald MD, FACC

## 2025-01-30 NOTE — PROGRESS NOTES
[facility] Cardiology    Patient ID: Anali Hensley is a 89 y.o. female.  : 1935   Contact: [phone]    Encounter date: 2025    PCP: Sagar Msea MD      Chief complaint:   Chief Complaint   Patient presents with    Heart Murmur     NP - TAVR Consult       Problem List:  Aortic stenosis  Echocardiogram 2025: LVEF 70%, mild LVH, mean aortic valve gradient 53 mmHg.  Systolic anterior motion of the mitral valve.  Moderate to severe mitral regurgitation.  Bronchiectasis  Anxiety/depression  Dementia, mild  Surgeries  Hysterectomy        Allergies   Allergen Reactions    Penicillins Swelling    Stevia [Stevioside] Swelling     PACK       Current Medications:    Current Outpatient Medications:     acetaminophen (Tylenol 8 Hour) 650 MG 8 hr tablet, Every 8 (Eight) Hours., Disp: , Rfl:     aspirin 81 MG EC tablet, Take 1 tablet by mouth Daily., Disp: , Rfl:     AZOPT 1 % ophthalmic suspension, , Disp: , Rfl:     bimatoprost (LUMIGAN) 0.01 % ophthalmic drops, Apply  to eye., Disp: , Rfl:     brimonidine-timolol (Combigan) 0.2-0.5 % ophthalmic solution, , Disp: , Rfl:     Diclofenac Sodium (VOLTAREN) 1 % gel gel, APPLY 2 GRAMS TO THE AFFECTED AREA(S) BY TOPICAL ROUTE 4 TIMES PER DAY (Patient taking differently: As Needed.), Disp: , Rfl:     docusate sodium (Colace) 100 MG capsule, Daily., Disp: , Rfl:     donepezil (ARICEPT) 10 MG tablet, Take 1 tablet by mouth Daily., Disp: , Rfl:     LORazepam (ATIVAN) 1 MG tablet, Take  by mouth. (Patient taking differently: Take 0.5 tablets by mouth Every Night.), Disp: , Rfl:     metFORMIN ER (GLUCOPHAGE-XR) 500 MG 24 hr tablet, Take 1 tablet by mouth Every 12 (Twelve) Hours., Disp: , Rfl:     mirtazapine (REMERON) 45 MG tablet, , Disp: , Rfl:     naproxen sodium (Aleve) 220 MG tablet, Every 12 (Twelve) Hours. (Patient taking differently: Take 1 tablet by mouth As Needed.), Disp: , Rfl:     promethazine (PHENERGAN) 12.5 MG  tablet, TAKE 1 OR 2 TABLETS EVERY 8 HOURS (Patient taking differently: Take 1 tablet by mouth As Needed.), Disp: , Rfl:     spironolactone (ALDACTONE) 25 MG tablet, Take 1 tablet by mouth Daily., Disp: , Rfl:     zinc gluconate 50 MG tablet, Take 1 tablet by mouth Daily., Disp: , Rfl:     metFORMIN (GLUCOPHAGE) 500 MG tablet, Take 1 tablet by mouth 2 (Two) Times a Day. (Patient not taking: Reported on 1/30/2025), Disp: , Rfl:     HPI    Anali Hensley is a 89 y.o. female who presents today for TAVR evaluation.  The patient is accompanied by her daughter today who takes very good care of her mother.  The aortic stenosis murmur was noted when her mother presented to the [facility] for evaluation of constipation.  She has not had any symptoms of shortness of breath chest pain or syncope.  On reflecting her daughter does note that she becomes a little fatigued at bath time and with showers.  The patient does not do much at home.  She sits a lot but she can walk with 2 canes and when they go to [place] she does walk down to the beach.      Over the last 2 years she has had some weight loss.  This initially started with metformin.  She has lost about 30 pounds.  She does not want to eat much now and her daughter is having to work with her to ensure adequate nutritional intake.    Her blood pressure was low recently and her carvedilol and diltiazem have been discontinued.  Her daughter has noticed that she had some lower extremity edema.  The patient told her daughter long ago that she did not want to have any kind of an open chest procedure like her  had.  She is having some memory issues and had some difficulty comprehending the conversation during today's visit.      The following portions of the patient's history were reviewed and updated as appropriate: allergies, current medications and problem list.    Pertinent positives as listed in the HPI.  All other systems reviewed are  "negative.         Vitals:    01/30/25 1356   BP: 118/58   BP Location: Right arm   Patient Position: Sitting   Cuff Size: Adult   Pulse: 71   SpO2: 97%   Weight: 53 kg (116 lb 12.8 oz)   Height: 152.4 cm (60\")       Physical Exam:  General: Alert  Neck: Jugular venous pressure is within normal limits. Carotids have normal upstrokes without bruits.   Cardiovascular: Heart has a nondisplaced focal PMI. Regular rate and rhythm. 2-3/6 SE murmur, no gallop or rub.  Lungs: A few basilar crackles are heard bilaterally.. Equal expansion is noted.   Extremities: Show no edema.  Skin: Warm and dry.  Neurologic: Nonfocal.         Advance Care Planning   ACP discussion was held with the patient during this visit. Patient has an advance directive (not in EMR), copy requested.       Procedures      Assessment:    ICD-10-CM ICD-9-CM   1. Aortic stenosis, severe  I35.0 424.1   2. Nonrheumatic mitral valve stenosis  I34.2 424.0     The transcatheter aortic valve was shown to the patient and her daughter.  The patient is not interested in an open procedure.  The risks of the transcatheter aortic valve were discussed with the patient and her daughter including a fairly high likelihood of pacemaker implantation due to a baseline right bundle branch block, risk of stroke, risk of vascular damage and bleeding risk.      Plan:  I think a GABE is important to further evaluate the patient's degree of mitral stenosis.  As she is not interested in an open chest procedure and she does have some signs of dementia I think aortic valve replacement is reasonable with medical management of the mitral valve.  She and her daughter understand that she is at high risk for pacemaker with a right bundle branch block at baseline.  Left heart catheterization with Dr. Marley.  Will schedule try to schedule the heart cath and the GABE on the same day.  CT surgery evaluation and CT scan will be scheduled.  Continue all other current medications.  F/up in after " procedure sooner if needed.      Shyanne Macdonald MD, FACC

## 2025-01-31 DIAGNOSIS — I34.2 NONRHEUMATIC MITRAL VALVE STENOSIS: ICD-10-CM

## 2025-01-31 DIAGNOSIS — I35.0 AORTIC STENOSIS, SEVERE: Primary | ICD-10-CM

## 2025-01-31 NOTE — PROGRESS NOTES
Referral received for severe aortic valve stenosis. Known to Dr. Marley and had a consult with Dr. Macdonald on 1/30. Called patient to discuss, spoke with daughter Aleida. No follow up questions, they feel well informed about AS and the TAVR workup process. Will need a GABE with Dr. Macdonald and [facility] with Dr. Marley, scheduling notified.     Will follow up after testing.     Educational folder mailed today, my contact information is included.

## 2025-02-05 DIAGNOSIS — I35.0 AORTIC STENOSIS, SEVERE: Primary | ICD-10-CM

## 2025-02-05 DIAGNOSIS — R09.89 OTHER SPECIFIED SYMPTOMS AND SIGNS INVOLVING THE CIRCULATORY AND RESPIRATORY SYSTEMS: ICD-10-CM

## 2025-02-05 NOTE — PROGRESS NOTES
TAVR workup ongoing. LHC/GABE scheduled fo 2/11. Will need CTA with TAVR protocol, carotid ultrasound and consult with CT surgery.    Daughter called with questions for her mother. We discussed TAVR workup process and future TAVR date.     She and her mother [place] Unsure of how to proceed with travelling if next available TAVR date is 4/24.

## 2025-02-11 ENCOUNTER — HOSPITAL ENCOUNTER (OUTPATIENT)
Dept: CARDIOLOGY | Facility: HOSPITAL | Age: 89
Discharge: HOME | End: 2025-02-11
Payer: MEDICARE

## 2025-02-11 ENCOUNTER — HOSPITAL ENCOUNTER (OUTPATIENT)
Facility: HOSPITAL | Age: 89
Setting detail: HOSPITAL OUTPATIENT SURGERY
Discharge: HOME | End: 2025-02-11
Attending: INTERNAL MEDICINE
Payer: MEDICARE

## 2025-02-11 VITALS
TEMPERATURE: 97 F | DIASTOLIC BLOOD PRESSURE: 76 MMHG | OXYGEN SATURATION: 95 % | SYSTOLIC BLOOD PRESSURE: 112 MMHG | HEART RATE: 70 BPM | RESPIRATION RATE: 16 BRPM

## 2025-02-11 DIAGNOSIS — I35.0 AORTIC STENOSIS, SEVERE: ICD-10-CM

## 2025-02-11 DIAGNOSIS — I34.2 NONRHEUMATIC MITRAL VALVE STENOSIS: ICD-10-CM

## 2025-02-11 LAB
ALBUMIN SERPL-MCNC: 4 G/DL (ref 3.5–5.2)
ALBUMIN/GLOB SERPL: 1.4 G/DL
ALP SERPL-CCNC: 112 U/L (ref 39–117)
ALT SERPL W P-5'-P-CCNC: 9 U/L (ref 1–33)
ANION GAP SERPL CALCULATED.3IONS-SCNC: 13 MMOL/L (ref 5–15)
AST SERPL-CCNC: 17 U/L (ref 1–32)
BILIRUB SERPL-MCNC: 0.3 MG/DL (ref 0–1.2)
BUN SERPL-MCNC: 25 MG/DL (ref 8–23)
BUN/CREAT SERPL: 40.3 (ref 7–25)
CALCIUM SPEC-SCNC: 10.1 MG/DL (ref 8.6–10.5)
CHLORIDE SERPL-SCNC: 99 MMOL/L (ref 98–107)
CHOLEST SERPL-MCNC: 118 MG/DL (ref 0–200)
CO2 SERPL-SCNC: 25 MMOL/L (ref 22–29)
CREAT SERPL-MCNC: 0.62 MG/DL (ref 0.57–1)
DEPRECATED RDW RBC AUTO: 47.6 FL (ref 37–54)
EGFRCR SERPLBLD CKD-EPI 2021: 86.3 ML/MIN/1.73
ERYTHROCYTE [DISTWIDTH] IN BLOOD BY AUTOMATED COUNT: 14 % (ref 12.3–15.4)
GLOBULIN UR ELPH-MCNC: 2.9 GM/DL
GLUCOSE SERPL-MCNC: 97 MG/DL (ref 65–99)
HBA1C MFR BLD: 5.6 % (ref 4.8–5.6)
HCT VFR BLD AUTO: 38.2 % (ref 34–46.6)
HDLC SERPL-MCNC: 49 MG/DL (ref 40–60)
HGB BLD-MCNC: 12.2 G/DL (ref 12–15.9)
LDLC SERPL CALC-MCNC: 59 MG/DL (ref 0–100)
LDLC/HDLC SERPL: 1.25 {RATIO}
MCH RBC QN AUTO: 29.8 PG (ref 26.6–33)
MCHC RBC AUTO-ENTMCNC: 31.9 G/DL (ref 31.5–35.7)
MCV RBC AUTO: 93.4 FL (ref 79–97)
PLATELET # BLD AUTO: 301 10*3/MM3 (ref 140–450)
PMV BLD AUTO: 11.5 FL (ref 6–12)
POTASSIUM SERPL-SCNC: 4.8 MMOL/L (ref 3.5–5.2)
PROT SERPL-MCNC: 6.9 G/DL (ref 6–8.5)
RBC # BLD AUTO: 4.09 10*6/MM3 (ref 3.77–5.28)
SODIUM SERPL-SCNC: 137 MMOL/L (ref 136–145)
TRIGL SERPL-MCNC: 39 MG/DL (ref 0–150)
VLDLC SERPL-MCNC: 10 MG/DL (ref 5–40)
WBC NRBC COR # BLD AUTO: 12.39 10*3/MM3 (ref 3.4–10.8)

## 2025-02-11 PROCEDURE — 80061 LIPID PANEL: CPT | Performed by: NURSE PRACTITIONER

## 2025-02-11 PROCEDURE — 83036 HEMOGLOBIN GLYCOSYLATED A1C: CPT | Performed by: NURSE PRACTITIONER

## 2025-02-11 PROCEDURE — 93320 DOPPLER ECHO COMPLETE: CPT

## 2025-02-11 PROCEDURE — 25810000003 SODIUM CHLORIDE 0.9 % SOLUTION: Performed by: INTERNAL MEDICINE

## 2025-02-11 PROCEDURE — 25010000002 HEPARIN (PORCINE) PER 1000 UNITS: Performed by: INTERNAL MEDICINE

## 2025-02-11 PROCEDURE — 25010000002 FENTANYL CITRATE (PF) 50 MCG/ML SOLUTION: Performed by: INTERNAL MEDICINE

## 2025-02-11 PROCEDURE — 93454 CORONARY ARTERY ANGIO S&I: CPT | Performed by: INTERNAL MEDICINE

## 2025-02-11 PROCEDURE — 36415 COLL VENOUS BLD VENIPUNCTURE: CPT

## 2025-02-11 PROCEDURE — C1769 GUIDE WIRE: HCPCS | Performed by: INTERNAL MEDICINE

## 2025-02-11 PROCEDURE — 80053 COMPREHEN METABOLIC PANEL: CPT | Performed by: NURSE PRACTITIONER

## 2025-02-11 PROCEDURE — 93325 DOPPLER ECHO COLOR FLOW MAPG: CPT

## 2025-02-11 PROCEDURE — 25010000002 MIDAZOLAM PER 1 MG: Performed by: INTERNAL MEDICINE

## 2025-02-11 PROCEDURE — 25010000002 NICARDIPINE 2.5 MG/ML SOLUTION: Performed by: INTERNAL MEDICINE

## 2025-02-11 PROCEDURE — C1894 INTRO/SHEATH, NON-LASER: HCPCS | Performed by: INTERNAL MEDICINE

## 2025-02-11 PROCEDURE — 25010000002 LIDOCAINE PF 1% 1 % SOLUTION: Performed by: INTERNAL MEDICINE

## 2025-02-11 PROCEDURE — 85027 COMPLETE CBC AUTOMATED: CPT | Performed by: NURSE PRACTITIONER

## 2025-02-11 PROCEDURE — 25510000001 IOPAMIDOL PER 1 ML: Performed by: INTERNAL MEDICINE

## 2025-02-11 RX ORDER — ETOMIDATE 2 MG/ML
0.1 INJECTION INTRAVENOUS ONCE
Status: DISCONTINUED | OUTPATIENT
Start: 2025-02-11 | End: 2025-02-11 | Stop reason: HOSPADM

## 2025-02-11 RX ORDER — NALOXONE HCL 0.4 MG/ML
0.4 VIAL (ML) INJECTION ONCE AS NEEDED
Status: DISCONTINUED | OUTPATIENT
Start: 2025-02-11 | End: 2025-02-11 | Stop reason: HOSPADM

## 2025-02-11 RX ORDER — HEPARIN SODIUM 1000 [USP'U]/ML
INJECTION, SOLUTION INTRAVENOUS; SUBCUTANEOUS
Status: DISCONTINUED | OUTPATIENT
Start: 2025-02-11 | End: 2025-02-11 | Stop reason: HOSPADM

## 2025-02-11 RX ORDER — FLUMAZENIL 0.1 MG/ML
0.5 INJECTION INTRAVENOUS ONCE AS NEEDED
Status: DISCONTINUED | OUTPATIENT
Start: 2025-02-11 | End: 2025-02-11 | Stop reason: HOSPADM

## 2025-02-11 RX ORDER — MIDAZOLAM HYDROCHLORIDE 1 MG/ML
INJECTION, SOLUTION INTRAMUSCULAR; INTRAVENOUS
Status: COMPLETED | OUTPATIENT
Start: 2025-02-11 | End: 2025-02-11

## 2025-02-11 RX ORDER — MIDAZOLAM HYDROCHLORIDE 1 MG/ML
2-8 INJECTION, SOLUTION INTRAMUSCULAR; INTRAVENOUS ONCE AS NEEDED
Status: DISCONTINUED | OUTPATIENT
Start: 2025-02-11 | End: 2025-02-11 | Stop reason: HOSPADM

## 2025-02-11 RX ORDER — SODIUM CHLORIDE 0.9 % (FLUSH) 0.9 %
10 SYRINGE (ML) INJECTION EVERY 12 HOURS SCHEDULED
Status: DISCONTINUED | OUTPATIENT
Start: 2025-02-11 | End: 2025-02-11 | Stop reason: HOSPADM

## 2025-02-11 RX ORDER — ETOMIDATE 2 MG/ML
INJECTION INTRAVENOUS
Status: COMPLETED | OUTPATIENT
Start: 2025-02-11 | End: 2025-02-11

## 2025-02-11 RX ORDER — IOPAMIDOL 755 MG/ML
INJECTION, SOLUTION INTRAVASCULAR
Status: DISCONTINUED | OUTPATIENT
Start: 2025-02-11 | End: 2025-02-11 | Stop reason: HOSPADM

## 2025-02-11 RX ORDER — ASPIRIN 325 MG
325 TABLET ORAL ONCE
Status: DISCONTINUED | OUTPATIENT
Start: 2025-02-11 | End: 2025-02-11 | Stop reason: HOSPADM

## 2025-02-11 RX ORDER — LIDOCAINE HYDROCHLORIDE 10 MG/ML
INJECTION, SOLUTION EPIDURAL; INFILTRATION; INTRACAUDAL; PERINEURAL
Status: DISCONTINUED | OUTPATIENT
Start: 2025-02-11 | End: 2025-02-11 | Stop reason: HOSPADM

## 2025-02-11 RX ORDER — FENTANYL CITRATE 50 UG/ML
50-100 INJECTION, SOLUTION INTRAMUSCULAR; INTRAVENOUS ONCE AS NEEDED
Status: DISCONTINUED | OUTPATIENT
Start: 2025-02-11 | End: 2025-02-11 | Stop reason: HOSPADM

## 2025-02-11 RX ORDER — FENTANYL CITRATE 50 UG/ML
INJECTION, SOLUTION INTRAMUSCULAR; INTRAVENOUS
Status: DISCONTINUED | OUTPATIENT
Start: 2025-02-11 | End: 2025-02-11 | Stop reason: HOSPADM

## 2025-02-11 RX ORDER — ACETAMINOPHEN 325 MG/1
650 TABLET ORAL EVERY 4 HOURS PRN
Status: DISCONTINUED | OUTPATIENT
Start: 2025-02-11 | End: 2025-02-11 | Stop reason: HOSPADM

## 2025-02-11 RX ORDER — SODIUM CHLORIDE 9 MG/ML
40 INJECTION, SOLUTION INTRAVENOUS AS NEEDED
Status: DISCONTINUED | OUTPATIENT
Start: 2025-02-11 | End: 2025-02-11 | Stop reason: HOSPADM

## 2025-02-11 RX ORDER — SODIUM CHLORIDE 0.9 % (FLUSH) 0.9 %
1-10 SYRINGE (ML) INJECTION AS NEEDED
Status: DISCONTINUED | OUTPATIENT
Start: 2025-02-11 | End: 2025-02-11 | Stop reason: HOSPADM

## 2025-02-11 RX ORDER — FENTANYL CITRATE 50 UG/ML
INJECTION, SOLUTION INTRAMUSCULAR; INTRAVENOUS
Status: COMPLETED | OUTPATIENT
Start: 2025-02-11 | End: 2025-02-11

## 2025-02-11 RX ORDER — NITROGLYCERIN 0.4 MG/1
0.4 TABLET SUBLINGUAL
Status: DISCONTINUED | OUTPATIENT
Start: 2025-02-11 | End: 2025-02-11 | Stop reason: HOSPADM

## 2025-02-11 RX ORDER — ASPIRIN 325 MG
325 TABLET, DELAYED RELEASE (ENTERIC COATED) ORAL DAILY
Status: DISCONTINUED | OUTPATIENT
Start: 2025-02-12 | End: 2025-02-11 | Stop reason: HOSPADM

## 2025-02-11 RX ORDER — ONDANSETRON 2 MG/ML
4 INJECTION INTRAMUSCULAR; INTRAVENOUS EVERY 6 HOURS PRN
Status: DISCONTINUED | OUTPATIENT
Start: 2025-02-11 | End: 2025-02-11 | Stop reason: HOSPADM

## 2025-02-11 RX ADMIN — MIDAZOLAM HYDROCHLORIDE 2 MG: 1 INJECTION, SOLUTION INTRAMUSCULAR; INTRAVENOUS at 11:13

## 2025-02-11 RX ADMIN — ETOMIDATE 3 MG: 40 INJECTION, SOLUTION INTRAVENOUS at 11:15

## 2025-02-11 RX ADMIN — FENTANYL CITRATE 50 MCG: 50 INJECTION, SOLUTION INTRAMUSCULAR; INTRAVENOUS at 11:10

## 2025-02-11 RX ADMIN — MIDAZOLAM HYDROCHLORIDE 2 MG: 1 INJECTION, SOLUTION INTRAMUSCULAR; INTRAVENOUS at 11:10

## 2025-02-11 RX ADMIN — FENTANYL CITRATE 50 MCG: 50 INJECTION, SOLUTION INTRAMUSCULAR; INTRAVENOUS at 11:13

## 2025-02-11 NOTE — INTERVAL H&P NOTE
H&P reviewed. The patient was examined and there are no changes to the H&P.    LC Bazan MD, FACC

## 2025-02-11 NOTE — CONSULTS
Discussed and taught patient about type 2 diabetes self-management, risk factors, and importance of blood glucose control to reduce complications. Target blood glucose readings and A1c goals per ADA were reviewed. Reviewed with patient current A1c 5.6 and discussed its significance. Signs, symptoms, and treatment of hyperglycemia and hypoglycemia were discussed. Lifestyle changes such as physical activity with MD approval and healthy eating were encouraged. Largest struggle per dtr is patient does not eat and consumes high calorie instanst breakfast shakes. Advised conversation at next medical appointment to determine if glycemic goal aligns with other goals of care as patient having diarrhea on metformin and reported diminished appetite from this. Stressed the importance of strict blood sugar control after surgery to prevent complications such as infection and to promote healing of incision. Encouraged pt to monitor blood sugar at home 1+  times per day and to call PCP if blood sugar is trending high. Encouraged to keep record of blood glucose readings to take to follow up appointment with PCP. 30 minutes in the care and education of this patient.

## 2025-02-12 ENCOUNTER — TELEPHONE (OUTPATIENT)
Dept: INFUSION THERAPY | Facility: HOSPITAL | Age: 89
End: 2025-02-12
Payer: MEDICARE

## 2025-02-12 LAB
AORTIC DIMENSIONLESS INDEX: 0.37 (DI)
AV MEAN PRESS GRAD SYS DOP V1V2: 57 MMHG
AV VMAX SYS DOP: 466 CM/SEC
BH CV ECHO MEAS - AO MAX PG: 86.9 MMHG
BH CV ECHO MEAS - AO V2 VTI: 95.3 CM
BH CV ECHO MEAS - AVA(I,D): 0.95 CM2
BH CV ECHO MEAS - LV MAX PG: 11.6 MMHG
BH CV ECHO MEAS - LV MEAN PG: 7 MMHG
BH CV ECHO MEAS - LV V1 MAX: 170 CM/SEC
BH CV ECHO MEAS - LV V1 VTI: 31.9 CM
BH CV ECHO MEAS - LVOT AREA: 2.8 CM2
BH CV ECHO MEAS - LVOT DIAM: 1.9 CM
BH CV ECHO MEAS - MV MAX PG: 20.3 MMHG
BH CV ECHO MEAS - MV MEAN PG: 11.5 MMHG
BH CV ECHO MEAS - MV P1/2T: 77.2 MSEC
BH CV ECHO MEAS - MV V2 VTI: 46 CM
BH CV ECHO MEAS - MVA(VTI): 1.97 CM2
BH CV ECHO MEAS - SV(LVOT): 90.4 ML
LV EF 2D ECHO EST: 60 %
MV VALVE AREA BY PLANIMETRY: 2.25 CM2

## 2025-02-12 NOTE — TELEPHONE ENCOUNTER
Attempted to contact patient as pre-procedure phone call prior to planned CT TAVR planned for 2/13/25. Left voicemail message reminder with arrival time, nothing to eat or drink 2 hours prior to arrival time, no caffeine after midnight, okay to take medications as per normal routine,  is recommended, and if have any questions may contact outpatient prep and recovery at [phone].

## 2025-02-13 ENCOUNTER — HOSPITAL ENCOUNTER (OUTPATIENT)
Dept: CT IMAGING | Facility: HOSPITAL | Age: 89
Discharge: HOME | End: 2025-02-13
Payer: MEDICARE

## 2025-02-13 ENCOUNTER — HOSPITAL ENCOUNTER (OUTPATIENT)
Dept: CARDIOLOGY | Facility: HOSPITAL | Age: 89
Discharge: HOME | End: 2025-02-13
Payer: MEDICARE

## 2025-02-13 VITALS
WEIGHT: 115 LBS | RESPIRATION RATE: 16 BRPM | HEART RATE: 68 BPM | DIASTOLIC BLOOD PRESSURE: 70 MMHG | OXYGEN SATURATION: 97 % | HEIGHT: 63 IN | SYSTOLIC BLOOD PRESSURE: 122 MMHG | TEMPERATURE: 97.3 F | BODY MASS INDEX: 20.38 KG/M2

## 2025-02-13 VITALS — HEIGHT: 63 IN | BODY MASS INDEX: 20.39 KG/M2 | WEIGHT: 115.08 LBS

## 2025-02-13 DIAGNOSIS — I35.0 AORTIC STENOSIS, SEVERE: ICD-10-CM

## 2025-02-13 DIAGNOSIS — R09.89 OTHER SPECIFIED SYMPTOMS AND SIGNS INVOLVING THE CIRCULATORY AND RESPIRATORY SYSTEMS: ICD-10-CM

## 2025-02-13 DIAGNOSIS — I35.0 AORTIC STENOSIS, SEVERE: Primary | ICD-10-CM

## 2025-02-13 LAB — GLUCOSE BLDC GLUCOMTR-MCNC: 134 MG/DL (ref 70–130)

## 2025-02-13 PROCEDURE — 93880 EXTRACRANIAL BILAT STUDY: CPT

## 2025-02-13 PROCEDURE — 25510000001 IOPAMIDOL PER 1 ML

## 2025-02-13 PROCEDURE — 82948 REAGENT STRIP/BLOOD GLUCOSE: CPT

## 2025-02-13 PROCEDURE — 71275 CT ANGIOGRAPHY CHEST: CPT

## 2025-02-13 PROCEDURE — 74174 CTA ABD&PLVS W/CONTRAST: CPT

## 2025-02-13 RX ORDER — METOPROLOL TARTRATE 50 MG
50 TABLET ORAL ONCE AS NEEDED
Status: DISCONTINUED | OUTPATIENT
Start: 2025-02-13 | End: 2025-02-14 | Stop reason: HOSPADM

## 2025-02-13 RX ORDER — METOPROLOL TARTRATE 100 MG/1
100 TABLET ORAL ONCE AS NEEDED
Status: DISCONTINUED | OUTPATIENT
Start: 2025-02-13 | End: 2025-02-14 | Stop reason: HOSPADM

## 2025-02-13 RX ORDER — SODIUM CHLORIDE 0.9 % (FLUSH) 0.9 %
10 SYRINGE (ML) INJECTION AS NEEDED
Status: DISCONTINUED | OUTPATIENT
Start: 2025-02-13 | End: 2025-02-14 | Stop reason: HOSPADM

## 2025-02-13 RX ORDER — IOPAMIDOL 755 MG/ML
100 INJECTION, SOLUTION INTRAVASCULAR
Status: DISCONTINUED | OUTPATIENT
Start: 2025-02-13 | End: 2025-02-13

## 2025-02-13 RX ORDER — IOPAMIDOL 755 MG/ML
100 INJECTION, SOLUTION INTRAVASCULAR
Status: COMPLETED | OUTPATIENT
Start: 2025-02-13 | End: 2025-02-13

## 2025-02-13 RX ORDER — SODIUM CHLORIDE 9 MG/ML
40 INJECTION, SOLUTION INTRAVENOUS AS NEEDED
Status: DISCONTINUED | OUTPATIENT
Start: 2025-02-13 | End: 2025-02-14 | Stop reason: HOSPADM

## 2025-02-13 RX ORDER — SODIUM CHLORIDE 0.9 % (FLUSH) 0.9 %
10 SYRINGE (ML) INJECTION EVERY 12 HOURS SCHEDULED
Status: DISCONTINUED | OUTPATIENT
Start: 2025-02-13 | End: 2025-02-14 | Stop reason: HOSPADM

## 2025-02-13 RX ADMIN — IOPAMIDOL 100 ML: 755 INJECTION, SOLUTION INTRAVENOUS at 14:55

## 2025-02-13 NOTE — PROGRESS NOTES
TAVR workup ongoing, will need a consult with CT surgery to complete evaluation. If imaging is satisfactory and all are in agreement, will schedule TAVR. Tentative April date TBD.    Adult Transthoracic Echo Complete W/ Cont if Necessary Per Protocol (01/13/2025 14:26)   Adult Transesophageal Echo (GABE) W/ Cont if Necessary Per Protocol (02/11/2025 12:06)   Cardiac Catheterization/Vascular Study (02/11/2025 12:45)   CTA and carotid duplex completed on 2/13, results pending.

## 2025-02-14 LAB
BH CV XLRA MEAS LEFT DIST CCA EDV: 11 CM/SEC
BH CV XLRA MEAS LEFT DIST CCA PSV: 52.4 CM/SEC
BH CV XLRA MEAS LEFT DIST ICA EDV: 10.1 CM/SEC
BH CV XLRA MEAS LEFT DIST ICA PSV: 31.4 CM/SEC
BH CV XLRA MEAS LEFT ICA/CCA RATIO: 1.1
BH CV XLRA MEAS LEFT MID CCA EDV: 10.3 CM/SEC
BH CV XLRA MEAS LEFT MID CCA PSV: 54.2 CM/SEC
BH CV XLRA MEAS LEFT MID ICA EDV: 13.4 CM/SEC
BH CV XLRA MEAS LEFT MID ICA PSV: 59.8 CM/SEC
BH CV XLRA MEAS LEFT PROX CCA EDV: 6.9 CM/SEC
BH CV XLRA MEAS LEFT PROX CCA PSV: 63.5 CM/SEC
BH CV XLRA MEAS LEFT PROX ECA EDV: 0 CM/SEC
BH CV XLRA MEAS LEFT PROX ECA PSV: 51.9 CM/SEC
BH CV XLRA MEAS LEFT PROX ICA EDV: 12.8 CM/SEC
BH CV XLRA MEAS LEFT PROX ICA PSV: 35.1 CM/SEC
BH CV XLRA MEAS LEFT PROX SCLA PSV: 84.1 CM/SEC
BH CV XLRA MEAS LEFT VERTEBRAL A EDV: 11.6 CM/SEC
BH CV XLRA MEAS LEFT VERTEBRAL A PSV: 43.4 CM/SEC
BH CV XLRA MEAS RIGHT DIST CCA EDV: 9.8 CM/SEC
BH CV XLRA MEAS RIGHT DIST CCA PSV: 53.3 CM/SEC
BH CV XLRA MEAS RIGHT DIST ICA EDV: 12.1 CM/SEC
BH CV XLRA MEAS RIGHT DIST ICA PSV: 38.6 CM/SEC
BH CV XLRA MEAS RIGHT ICA/CCA RATIO: 0.65
BH CV XLRA MEAS RIGHT MID CCA EDV: 11.8 CM/SEC
BH CV XLRA MEAS RIGHT MID CCA PSV: 63 CM/SEC
BH CV XLRA MEAS RIGHT MID ICA EDV: 11.8 CM/SEC
BH CV XLRA MEAS RIGHT MID ICA PSV: 40.2 CM/SEC
BH CV XLRA MEAS RIGHT PROX CCA EDV: 8.2 CM/SEC
BH CV XLRA MEAS RIGHT PROX CCA PSV: 63.2 CM/SEC
BH CV XLRA MEAS RIGHT PROX ECA EDV: 6.2 CM/SEC
BH CV XLRA MEAS RIGHT PROX ECA PSV: 35.5 CM/SEC
BH CV XLRA MEAS RIGHT PROX ICA EDV: 9.8 CM/SEC
BH CV XLRA MEAS RIGHT PROX ICA PSV: 41.1 CM/SEC
BH CV XLRA MEAS RIGHT PROX SCLA PSV: 115.3 CM/SEC
BH CV XLRA MEAS RIGHT VERTEBRAL A EDV: 8 CM/SEC
BH CV XLRA MEAS RIGHT VERTEBRAL A PSV: 35.3 CM/SEC
LEFT ARM BP: NORMAL MMHG
RIGHT ARM BP: NORMAL MMHG

## 2025-02-17 ENCOUNTER — DOCUMENTATION (OUTPATIENT)
Dept: CARDIOLOGY | Facility: HOSPITAL | Age: 89
End: 2025-02-17
Payer: MEDICARE

## 2025-02-17 NOTE — PROGRESS NOTES
Call received from daughter about TAVR date. Plan, prior to severe aortic valve stenosis diagnosis, was to travel in March and is now unsure of how to proceed with schedule due to need for TAVR. Next available TAVR date with Dr. Benjamin and Dr. Macdonald is 4/14. We discussed scheduling availability, recovery and follow up expectations. She has my contact information for any further questions.

## 2025-02-26 ENCOUNTER — DOCUMENTATION (OUTPATIENT)
Dept: CARDIOLOGY | Facility: HOSPITAL | Age: 89
End: 2025-02-26
Payer: MEDICARE

## 2025-02-26 NOTE — PROGRESS NOTES
"Dental clearance received from Dr. Mendoza. \"Patient has multiple failing restorations but none of them have active infection or abscess. No treatment is planned other than extractions upon catastrophic failure of the teeth/restorations\". See media tab for document  "

## 2025-03-07 NOTE — PROGRESS NOTES
[facility] Cardiothoracic Surgery New Patient Office Note     Date of Encounter: 03/10/2025     Name: Anali Hensley  : 1935     Referred By: Shyanne Macdonald,*  PCP: Sagar Mesa MD    Chief Complaint:    Chief Complaint   Patient presents with    Consult     New pt  per Dr. Shyanne Macdonald and  Rosalva PLATT TAVR consult. Pt's daughter states that her Mother has had some lower leg swelling, also states that upon standing that her Mother will be light headed or dizzy. Pt also has a poor appetite, some short term memory loss is beginning to occur.        Subjective      History of Present Illness:    Anali Hensley is a 89 y.o. female referred to Dr. Benjamin by Dr. Macdonald for consideration of TAVR. PMH: bronchiectasis, axiety, depression, dementia, and aortic stenosis. Patient has met with other members of the TAVR team and completed all testing. Due to her age and cormorbidities, the patient and her daughter are not interested in SAVR. Her daughter reports only mild worsening fatigue with activities such as showering. She denies any symptoms or shortness of breath or chest pain, although she has a very sedentary lifestyle. GABE demonstrates peak velocity of 466 cm/s, mean pressure gradient 57 mmHg and aortic valve area of 1cm2. Cardiac catheterization revealed non flow limiting CAD. Carotid duplex with <50% stenosis bilaterally. CTA TAVR imaging with widely patent vasculature.     Review of Systems:  Review of Systems   Constitutional: Positive for decreased appetite (Daughter states that her Mother has a poor appetite) and weight loss (related to medication). Negative for chills, fever, malaise/fatigue and weight gain.   HENT:  Positive for congestion. Negative for hearing loss.    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 Negative for chest pain, claudication, cyanosis,  dyspnea on exertion, irregular heartbeat, near-syncope, orthopnea, palpitations, paroxysmal nocturnal dyspnea and syncope.   Endocrine: Negative for polydipsia, polyphagia and polyuria.   Hematologic/Lymphatic: Negative for adenopathy. Does not bruise/bleed easily.   Musculoskeletal:  Positive for arthritis and joint pain (bilateral hands). Negative for falls, gout, joint swelling, muscle weakness and myalgias.   Gastrointestinal:  Positive for constipation. Negative for abdominal pain, anorexia, dysphagia, heartburn, nausea and vomiting.   Genitourinary:  Negative for dysuria and hematuria.   Neurological:  Positive for dizziness (when first standing up). Negative for focal weakness, headaches, loss of balance, numbness, seizures, vertigo and weakness.   Psychiatric/Behavioral:  Positive for memory loss (beginning to have short terrm memory loss). Negative for altered mental status, depression, substance abuse and suicidal ideas. The patient is not nervous/anxious.    Allergic/Immunologic: Positive for environmental allergies. Negative for HIV exposure and persistent infections.       I have reviewed the following portions of the patient's history: problem list, current medications, allergies, past surgical history, past medical history, past social history, past family history, and ROS and confirm it's accurate.    Allergies:  Allergies   Allergen Reactions    Penicillins Swelling    Stevia [Stevioside] Swelling     PACK       Medications:      Current Outpatient Medications:     acetaminophen (Tylenol 8 Hour) 650 MG 8 hr tablet, Every 8 (Eight) Hours., Disp: , Rfl:     aspirin 81 MG EC tablet, Take 1 tablet by mouth Daily., Disp: , Rfl:     AZOPT 1 % ophthalmic suspension, Administer 1 drop to both eyes., Disp: , Rfl:     bimatoprost (LUMIGAN) 0.01 % ophthalmic drops, Administer 1 drop to both eyes Every Night., Disp: , Rfl:     brimonidine-timolol (Combigan) 0.2-0.5 % ophthalmic solution, Administer 1 drop to  "both eyes Every 12 (Twelve) Hours., Disp: , Rfl:     Diclofenac Sodium (VOLTAREN) 1 % gel gel, APPLY 2 GRAMS TO THE AFFECTED AREA(S) BY TOPICAL ROUTE 4 TIMES PER DAY (Patient taking differently: As Needed (PRN).), Disp: , Rfl:     docusate sodium (Colace) 100 MG capsule, Take 1 capsule by mouth Daily., Disp: , Rfl:     donepezil (ARICEPT) 10 MG tablet, Take 1 tablet by mouth Daily., Disp: , Rfl:     LORazepam (ATIVAN) 1 MG tablet, Take  by mouth. (Patient taking differently: Take 0.5 tablets by mouth Every Night.), Disp: , Rfl:     mirtazapine (REMERON) 45 MG tablet, Take 1 tablet by mouth Every Night., Disp: , Rfl:     naproxen sodium (Aleve) 220 MG tablet, Every 12 (Twelve) Hours. (Patient taking differently: Take 1 tablet by mouth As Needed.), Disp: , Rfl:     promethazine (PHENERGAN) 12.5 MG tablet, TAKE 1 OR 2 TABLETS EVERY 8 HOURS (Patient taking differently: Take 1 tablet by mouth As Needed.), Disp: , Rfl:     spironolactone (ALDACTONE) 25 MG tablet, Take 1 tablet by mouth Daily., Disp: , Rfl:     zinc gluconate 50 MG tablet, Take 1 tablet by mouth Daily., Disp: , Rfl:     metFORMIN ER (GLUCOPHAGE-XR) 500 MG 24 hr tablet, Take 1 tablet by mouth Every 12 (Twelve) Hours. (Patient not taking: Reported on 3/10/2025), Disp: , Rfl:     History:   Past Medical History:   Diagnosis Date    H/O chest x-ray 02/16/2016    no acute infiltrates or effusions, no change compared to her prior film from 2014. In comparison to our film from the office in 2014 which was compared to 2010; taking into account differences in technique the really does not look like there is much of a change in the prominent interstitial markings that are present. No effusions as above. No consolidations.     H/O chest x-ray 10/28/2010    Cariac silhouute is 12/24 and at upper limits of normal size. Several calcified nodules consistent with old granulomatous disease. A prominent \"dirty\" interstitial pattern but no consolidation of air bronchograms, " In comparison to our prior films from 10/14/09 there is probably no significant interval change    H/O chest x-ray 10/14/2009    Good qaulity. CT ratio is 11/25 cm. Increased interstitial markings on the bases. Old granulomatou disease is present. No pleural effusions. No pneumothorax. And overall, no significant changes compared to prior films from 9/25/08    H/O CT scan of chest     showing a patchy infiltrate in the right middle lobe which resembled a masslike consolidation.Improved after abx, with repeat CT in [place] neg/ followed in masslike consolidation.Improved after abx, with repeat CT in [place] neg/ followed inmasslike consolidation.Improved after abx, with repeat CT in [place] neg/ followed [id number] by her PCP; she was to be referred back for bronch if persis.    History of bronchoscopy     Reported negative bronchoscopy in 1993, then referred to Dr. Asher 2005 who  repeated a CAT scan due to R sided infiltrate with bronchiectasis exacerbation.    History of PFTs 02/16/2016    Pulmonary function tests reveal no obstruction, restrictive pattern as before. FEV1 similar to prior at 1.26, 66%. Minute ventilation reduced.    History of PFTs 06/09/2015    no obstruction based on normal FEV/FVC ration though FEC dec sl from last. FVC reduced sugg a restrictive pattern    History of PFTs 10/22/2013    Ration normal. No obstruction. no restriction. MVV normal. no response BD rX    Hx of chest x-ray 09/25/2008    Ct ratio is 12.5/24.5 Costophrenic angles are clear. Increased interstitial markings over both bases. No acute changes. Old granulomatous disease present    Vaginal wall prolapse     H/o       Past Surgical History:   Procedure Laterality Date    CARDIAC CATHETERIZATION N/A 2/11/2025    Procedure: Left Heart Cath;  Surgeon: Hudson Marley MD;  Location: [facility] CATH INVASIVE [facility];  Service: Cardiovascular;  Laterality: N/A;    HYSTERECTOMY         Social History     Socioeconomic History     Marital status:    Tobacco Use    Smoking status: Never     Passive exposure: Past    Smokeless tobacco: Never   Vaping Use    Vaping status: Never Used   Substance and Sexual Activity    Alcohol use: No    Drug use: No    Sexual activity: Defer        Family History   Problem Relation Age of Onset    Hypertension Mother     Osteoporosis Mother     Heart disease Father     No Known Problems Brother        Objective     Imaging/Labs:    CT Angio TAVR Chest Abdomen Pelvis (02/13/2025 15:01)   Result Date: 2/14/2025  Impression:   1.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   2.Multifocal tree-in-bud and nodular opacities throughout the lungs bilaterally with bronchiectasis and near complete right middle lobar atelectasis and collapse. Findings most likely reflect acute on chronic atypical infection such as STEVEN.   Electronically Signed: Austin Tinajero MD  2/14/2025 9:31 AM EST      Duplex Carotid Ultrasound CAR (02/13/2025 16:21)   Interpretation Summary    Right internal carotid artery demonstrates a less than 50% stenosis.    Antegrade right vertebral flow.    Left internal carotid artery demonstrates a less than 50% stenosis.    Antegrade left vertebral flow.    Adult Transesophageal Echo (GABE) W/ Cont if Necessary Per Protocol (02/11/2025 12:06)   Interpretation Summary    Left ventricular systolic function is normal. Estimated left ventricular EF = 60%    Left ventricular wall thickness is consistent with mild concentric hypertrophy.    Severe aortic valve stenosis is present. Aortic valve area is 1 cm2.  Valve mobility is limited.  Consider BAV    Peak velocity of the flow distal to the aortic valve is 466 cm/s. Aortic valve mean pressure gradient is 57 mmHg.    The aortic valve annulus is 2.3 cm.    Moderate mitral valve regurgitation is present.    Moderate to severe mitral valve stenosis is present. The mitral valve mean gradient is 11 - 12 mmHg.    Trace to mild  "tricuspid regurgitation.    Estimated right ventricular systolic pressure from tricuspid regurgitation is normal (<35 mmHg).    Cardiac Catheterization/Vascular Study (02/11/2025 12:45)   Indications  Aortic stenosis, severe [I35.0 (ICD-10-CM)]   Nonrheumatic mitral valve stenosis [I34.2 (ICD-10-CM)]         Conclusion    Non flow-limiting coronary artery disease    50% LAD, 30% RCA    Physical Exam:  Vitals:    03/10/25 1104   BP: 120/68   BP Location: Left arm   Pulse: 67   Temp: 98.3 °F (36.8 °C)   SpO2: 98%   Weight: 53.3 kg (117 lb 6.4 oz)   Height: 165.1 cm (65\")  Comment: per pt      Body mass index is 19.54 kg/m².  BMI is within normal parameters. No other follow-up for BMI required.       Physical Exam  Vitals and nursing note reviewed.   Constitutional:       General: She is not in acute distress.     Appearance: She is normal weight.      Comments: Frail appearing   HENT:      Head: Normocephalic and atraumatic.   Neck:      Vascular: No carotid bruit or JVD.   Cardiovascular:      Rate and Rhythm: Normal rate and regular rhythm.      Pulses:           Radial pulses are 2+ on the right side and 2+ on the left side.        Dorsalis pedis pulses are 2+ on the right side and 2+ on the left side.        Posterior tibial pulses are 2+ on the right side and 2+ on the left side.      Heart sounds: Normal heart sounds. No murmur heard.      with a grade of 4/6.   Pulmonary:      Effort: Pulmonary effort is normal.      Breath sounds: Normal breath sounds.   Abdominal:      General: There is no abdominal bruit.      Palpations: There is no pulsatile mass.   Musculoskeletal:      Right lower leg: No edema.      Left lower leg: No edema.   Skin:     General: Skin is warm.      Capillary Refill: Capillary refill takes less than 2 seconds.   Neurological:      Mental Status: She is alert. Mental status is at baseline.      Gait: Gait abnormal.   Psychiatric:         Attention and Perception: Attention normal.         " Mood and Affect: Mood normal.         Behavior: Behavior normal. Behavior is cooperative.         Cognition and Memory: Memory is impaired.         Assessment / Plan    Anali Hensley is a 89 y.o. female referred to Dr. Benjamin by Dr. Macdonald for consideration of TAVR. PMH: bronchiectasis, axiety, depression, dementia, and aortic stenosis.     Assessment / Plan:  1. Aortic stenosis, severe   Complaints of mild lower extremity swelling, denies shortness of breath or chest pain. Daughter notes possible mild worsening fatigue with activities such as showering  Has met with other members of TAVR team and completed all testing, due to age and comorbidities, patient and daughter are not interested in SAVR  GABE with severe aortic stenosis; peak velocity 466 cm/s, mean pressure gradient 57 mmHg, and aortic valve area of 1cm2  Cardiac catheterization non flow limiting CAD  Carotid duplex <50% stenosis bilaterally   TAVR CTA widely patent vasculature; bilateral bronchiectasis and near complete middle lobe atelectasis and collapse, likely acute on chronic atypical infection   Discussed risks of procedure including but not limited to death, MI, CVA, bleeding, blood clots, necessity to convert to open procedure, need for pacemaker, infection, etc.  Per Dr. Cesar, high risk for need for pacemaker  Due to possible underlying acute on chronic infection and bronchiectasis on CTA, risks related to use of mechanical ventilation   Also increased risk with anesthesia related to dementia  Discussed TAVR procedure and expected post-op expectations   Patient's daughter would like to discuss with Dr. Mraley as their family has been seeing him for many years and they value his judgement and would like to discuss TAVR with him or treating aortic stenosis medically   Will reach out to Dr. Marley's office to schedule follow up appt with patient  Due to CTA imaging on lungs, will refer patient to pulmonology and follow up in 6-8  weeks after patient and daughter have met with Dr. Marley and after pulmonary appt to decide if patient would like to proceed with TAVR        Follow Up:   Return in about 8 weeks (around 5/5/2025) for after appt with pulmonology .   Or sooner for any further concerns or worsening sign and symptoms. If unable to reach us in the office please dial 911 or go to the nearest emergency department.      LC Kelley  [facility] Cardiothoracic Surgery    Time Spent: I spent 67 minutes caring for Anali on this date of service. This time includes time spent by me in the following activities: preparing for the visit, reviewing tests, obtaining and/or reviewing a separately obtained history, performing a medically appropriate examination and/or evaluation, counseling and educating the patient/family/caregiver, referring and communicating with other health care professionals, documenting information in the medical record, independently interpreting results and communicating that information with the patient/family/caregiver, and care coordination.

## 2025-03-10 ENCOUNTER — OFFICE VISIT (OUTPATIENT)
Dept: CARDIAC SURGERY | Facility: CLINIC | Age: 89
End: 2025-03-10
Payer: MEDICARE

## 2025-03-10 VITALS
TEMPERATURE: 98.3 F | OXYGEN SATURATION: 98 % | HEIGHT: 65 IN | HEART RATE: 67 BPM | BODY MASS INDEX: 19.56 KG/M2 | DIASTOLIC BLOOD PRESSURE: 68 MMHG | SYSTOLIC BLOOD PRESSURE: 120 MMHG | WEIGHT: 117.4 LBS

## 2025-03-10 DIAGNOSIS — I35.0 AORTIC STENOSIS, SEVERE: Primary | ICD-10-CM

## 2025-03-10 PROCEDURE — 99205 OFFICE O/P NEW HI 60 MIN: CPT

## 2025-03-14 ENCOUNTER — PATIENT ROUNDING (BHMG ONLY) (OUTPATIENT)
Dept: CARDIAC SURGERY | Facility: CLINIC | Age: 89
End: 2025-03-14
Payer: MEDICARE

## 2025-03-14 PROBLEM — R01.1 HEART MURMUR: Status: ACTIVE | Noted: 2025-03-14

## 2025-03-14 NOTE — PROGRESS NOTES
March 14, 2025    Hello, may I speak with Anali Hensley?    My name is Tanya      I am  with [facility] CARDIOTHORACIC SURGERY  [address]  [facility] [address] [phone].    Before we get started may I verify your date of birth? 1935    I am calling to officially welcome you to our practice and ask about your recent visit. Is this a good time to talk? YES    Tell me about your visit with us. What things went well?  WENT GOOD        We're always looking for ways to make our patients' experiences even better. Do you have recommendations on ways we may improve?  NO    Overall were you satisfied with your first visit to our practice? YES       I appreciate you taking the time to speak with me today. Is there anything else I can do for you? NO      Thank you, and have a great day.

## 2025-03-14 NOTE — PROGRESS NOTES
[facility] Cardiology  Subjective:     Encounter Date: 03/17/2025      Patient ID: Anali Hensley is a 89 y.o. female.    Chief Complaint: Heart Murmur, aortic stenosis       PROBLEM LIST:  Heart murmur  Echo, 01/13/2025: EF 70%. Mild concentric hypertrophy. Severe aortic stenosis. Aortic pressure gradient: max (92 mmHg) mean (53 mmHg). Systolic anterior motion of anterior mitral leaflet. Moderate to severe MR. Moderate TR with RVSP of 35-45 mmHg.   Bronchiectasis   Aortic stenosis  GABE, 02/11/2025: EF 60%. Mild concentric hypertrophy. Severe aortic stenosis. Aortic valve area is 1 cm^2. Aortic valve annulus is 2.3 cm. Moderate MR. Moderate to severe MV stenosis with mean graident of 11-12 mmHg. Trace to mild TR with normal RVSP.   LHC, 02/11/2025: 50% LAD, 30% RCA.   Carotid duplex, 02/13/2025: CAMI/LICA <50% stenosis.   Anxiety/depression  Surgeries:  Hysterectomy       History of Present Illness  Anali Hensley returns today for a follow up with a history of heart murmur and cardiac risk factors. Since last visit, patient has been doing well overall from a cardiovascular standpoint. Patient's family is here because they are confused about the TAVR procedure after their consultation and are worried of any implications due to patient's bronchiectasis and recent CAT scan. They are planning to see pulmonary next month. She mentions dizziness when she stands up at times. Patient denies chest pain, shortness of breath, orthopnea, palpitations, edema,  and syncope.      Allergies   Allergen Reactions    Penicillins Swelling    Stevia [Stevioside] Swelling     PACK         Current Outpatient Medications:     acetaminophen (Tylenol 8 Hour) 650 MG 8 hr tablet, Every 8 (Eight) Hours., Disp: , Rfl:     aspirin 81 MG EC tablet, Take 1 tablet by mouth Daily., Disp: , Rfl:     AZOPT 1 % ophthalmic suspension, Administer 1 drop to both eyes., Disp: , Rfl:     bimatoprost (LUMIGAN) 0.01 %  "ophthalmic drops, Administer 1 drop to both eyes Every Night., Disp: , Rfl:     brimonidine-timolol (Combigan) 0.2-0.5 % ophthalmic solution, Administer 1 drop to both eyes Every 12 (Twelve) Hours., Disp: , Rfl:     Diclofenac Sodium (VOLTAREN) 1 % gel gel, APPLY 2 GRAMS TO THE AFFECTED AREA(S) BY TOPICAL ROUTE 4 TIMES PER DAY (Patient taking differently: As Needed (PRN).), Disp: , Rfl:     docusate sodium (Colace) 100 MG capsule, Take 1 capsule by mouth Daily., Disp: , Rfl:     donepezil (ARICEPT) 10 MG tablet, Take 1 tablet by mouth Daily., Disp: , Rfl:     LORazepam (ATIVAN) 1 MG tablet, Take  by mouth. (Patient taking differently: Take 0.5 tablets by mouth Every Night.), Disp: , Rfl:     mirtazapine (REMERON) 45 MG tablet, Take 1 tablet by mouth Every Night., Disp: , Rfl:     naproxen sodium (Aleve) 220 MG tablet, Every 12 (Twelve) Hours. (Patient taking differently: Take 1 tablet by mouth As Needed.), Disp: , Rfl:     promethazine (PHENERGAN) 12.5 MG tablet, TAKE 1 OR 2 TABLETS EVERY 8 HOURS (Patient taking differently: Take 1 tablet by mouth As Needed.), Disp: , Rfl:     spironolactone (ALDACTONE) 25 MG tablet, Take 1 tablet by mouth Daily., Disp: , Rfl:     zinc gluconate 50 MG tablet, Take 1 tablet by mouth Daily., Disp: , Rfl:     metFORMIN ER (GLUCOPHAGE-XR) 500 MG 24 hr tablet, Take 1 tablet by mouth Every 12 (Twelve) Hours. (Patient not taking: Reported on 3/17/2025), Disp: , Rfl:     The following portions of the patient's history were reviewed and updated as appropriate: allergies, current medications, past family history, past medical history, past social history, past surgical history and problem list.    ROS       Objective:     Vitals:    03/17/25 1416   BP: 112/58   BP Location: Left arm   Patient Position: Sitting   Pulse: 80   SpO2: 94%   Weight: 53.3 kg (117 lb 6.4 oz)   Height: 152.4 cm (60\")         Vitals reviewed.   Constitutional:       Appearance: Healthy appearance. Well-developed and " not in distress.   Eyes:      Conjunctiva/sclera: Conjunctivae normal.      Pupils: Pupils are equal, round, and reactive to light.   HENT:      Head: Normocephalic and atraumatic.    Mouth/Throat:      Pharynx: Oropharynx is clear.   Neck:      Thyroid: Thyroid normal. No thyromegaly.      Vascular: Normal carotid pulses. No carotid bruit or JVD. JVD normal.      Lymphadenopathy: No cervical adenopathy.   Pulmonary:      Effort: No respiratory distress.      Breath sounds: No wheezing. No rales.   Chest:      Chest wall: Not tender to palpatation.   Cardiovascular:      Normal rate. Regular rhythm.      Murmurs: There is a grade 3/6 systolic murmur at the URSB and LLSB, radiating to the neck.      No gallop.    Pulses:     Carotid: 2+ bilaterally.     Dorsalis pedis: 2+ bilaterally.     Posterior tibial: 2+ bilaterally.  Edema:     Peripheral edema absent.   Abdominal:      General: There is no distension or abdominal bruit.      Palpations: There is no abdominal mass.      Tenderness: There is no abdominal tenderness. There is no rebound.   Musculoskeletal:         General: No tenderness or deformity.      Extremities: No clubbing present.Skin:     General: Skin is warm and dry. There is no cyanosis.      Findings: No rash.   Neurological:      Mental Status: Alert, oriented to person, place, and time and oriented to person, place and time.         Lab Review:  Lab Results   Component Value Date    GLUCOSE 97 02/11/2025    BUN 25 (H) 02/11/2025    CREATININE 0.62 02/11/2025    BCR 40.3 (H) 02/11/2025    K 4.8 02/11/2025    CO2 25.0 02/11/2025    CALCIUM 10.1 02/11/2025    ALBUMIN 4.0 02/11/2025    ALKPHOS 112 02/11/2025    AST 17 02/11/2025    ALT 9 02/11/2025     Lab Results   Component Value Date    CHOL 118 02/11/2025    TRIG 39 02/11/2025    HDL 49 02/11/2025    LDL 59 02/11/2025      Lab Results   Component Value Date    WBC 12.39 (H) 02/11/2025    RBC 4.09 02/11/2025    HGB 12.2 02/11/2025    HCT 38.2  02/11/2025    MCV 93.4 02/11/2025     02/11/2025     Lab Results   Component Value Date    HGBA1C 5.60 02/11/2025        Procedures      Advance Care Planning   ACP discussion was held with the patient during this visit. Patient has an advance directive (not in EMR), copy requested.           Assessment:   Diagnoses and all orders for this visit:    1. Heart murmur (Primary)    2. Aortic stenosis, severe        Impression:    1. Aortic stenosis. GABE on 02/2025 showed severe aortic stenosis.  Patient interested in TAVR discussed with family and patient again.    2.  Chronic bronchiectasis, this likely explains the CT findings.    3.  Mitral regurgitation moderate.  Will manage medically    Hypertension well-controlled    Plan:.   Patient and family are still willing to undergo TAVR. We will discuss with Dr. Macdonald to get procedure scheduled.   Continue current medications.  Revisit in 6 MO, or sooner as needed.        Scribed for Hudson Marley MD by Lily Booth 3/17/2025  16:37 EDT  Hudson Marley MD      Please note that portions of this note may have been completed with a voice recognition program. Efforts were made to edit the dictations, but occasionally words are mistranscribed.

## 2025-03-17 ENCOUNTER — OFFICE VISIT (OUTPATIENT)
Dept: CARDIOLOGY | Facility: CLINIC | Age: 89
End: 2025-03-17
Payer: MEDICARE

## 2025-03-17 VITALS
BODY MASS INDEX: 23.05 KG/M2 | OXYGEN SATURATION: 94 % | DIASTOLIC BLOOD PRESSURE: 58 MMHG | WEIGHT: 117.4 LBS | SYSTOLIC BLOOD PRESSURE: 112 MMHG | HEIGHT: 60 IN | HEART RATE: 80 BPM

## 2025-03-17 DIAGNOSIS — I35.0 AORTIC STENOSIS, SEVERE: ICD-10-CM

## 2025-03-17 DIAGNOSIS — R01.1 HEART MURMUR: Primary | ICD-10-CM

## 2025-03-17 PROCEDURE — 1160F RVW MEDS BY RX/DR IN RCRD: CPT | Performed by: INTERNAL MEDICINE

## 2025-03-17 PROCEDURE — 99214 OFFICE O/P EST MOD 30 MIN: CPT | Performed by: INTERNAL MEDICINE

## 2025-03-17 PROCEDURE — 1159F MED LIST DOCD IN RCRD: CPT | Performed by: INTERNAL MEDICINE

## 2025-03-19 ENCOUNTER — TELEPHONE (OUTPATIENT)
Dept: CARDIAC SURGERY | Facility: CLINIC | Age: 89
End: 2025-03-19
Payer: MEDICARE

## 2025-03-19 NOTE — TELEPHONE ENCOUNTER
"Mrs. Hensley daughterAleida called stating that she had to call EMS yesterday for a wellness check on Mrs. Hensley. Patient felt jittery and fingers in left hand \"felt funny\"/ numbness. Daughter would like TAVR asap as she is concerned after this episode.    Per Aurelio, patient will need pulmonology consult for clearance prior to TAVR. Appt with pulmonologist, Dr. Morrissey 3/25/25. DaughterAleida is aware.   "

## 2025-03-24 DIAGNOSIS — I35.0 AORTIC STENOSIS, SEVERE: Primary | ICD-10-CM

## 2025-03-25 ENCOUNTER — OFFICE VISIT (OUTPATIENT)
Dept: PULMONOLOGY | Facility: CLINIC | Age: 89
End: 2025-03-25
Payer: MEDICARE

## 2025-03-25 ENCOUNTER — TELEPHONE (OUTPATIENT)
Dept: CARDIAC SURGERY | Facility: CLINIC | Age: 89
End: 2025-03-25
Payer: MEDICARE

## 2025-03-25 VITALS
OXYGEN SATURATION: 98 % | SYSTOLIC BLOOD PRESSURE: 122 MMHG | HEART RATE: 73 BPM | WEIGHT: 120 LBS | BODY MASS INDEX: 23.56 KG/M2 | TEMPERATURE: 97 F | HEIGHT: 60 IN | DIASTOLIC BLOOD PRESSURE: 74 MMHG

## 2025-03-25 DIAGNOSIS — I35.0 AORTIC STENOSIS, SEVERE: ICD-10-CM

## 2025-03-25 DIAGNOSIS — R93.89 ABNORMAL CHEST CT: ICD-10-CM

## 2025-03-25 DIAGNOSIS — J47.9 IDIOPATHIC BRONCHIECTASIS: Primary | ICD-10-CM

## 2025-03-25 PROCEDURE — 99204 OFFICE O/P NEW MOD 45 MIN: CPT | Performed by: INTERNAL MEDICINE

## 2025-03-25 NOTE — TELEPHONE ENCOUNTER
Caller: Aleida Ocampo    Relationship: Emergency Contact    Best call back number: [phone]    What is the best time to reach you: ANY    Who are you requesting to speak with (clinical staff, provider,  specific staff member): CLINICAL    Do you know the name of the person who called: PTS DAUGHTER    What was the call regarding: PTS DAUGHTER CALLED AND STATES SHE THINKS THE PT IS STARTING TO RETAIN SOME FLUID BASED OFF OF HER WEIGHING HER EVERY MORNING. PLEASE GIVE HER A CALL BACK TO DISCUSS. THANK YOU    Is it okay if the provider responds through MyChart: NO

## 2025-03-25 NOTE — PROGRESS NOTES
"  PULMONARY  NOTE    Chief Complaint     Severe aortic stenosis, bronchiectasis, non-smoker    History of Present Illness     89-year-old female referred for abnormal chest imaging    She has severe aortic stenosis and is being considered for TAVR  She underwent a TAVR planning CT scan of the chest which was abnormal from a pulmonary perspective  This abnormalities are as noted below    She has a past history of bronchiectasis  Previously followed by a pulmonary physician although with the family is not sure where  She probably has had CAT scans in the past but none are in our system and the family is not sure where she may have had them in the past, either    Previously followed by a pulmonary physician (they don't remember who) but was released due to the lack of regular respiratory symptoms    She has a chronic daily cough  There is been no change in her cough or sputum production  She has had no hemoptysis  No fevers or chills  She has not required antibiotics for a respiratory tract infection in a long time.    Patient Active Problem List   Diagnosis   • Arthritis   • Depression   • Diabetes mellitus   • Hypertension   • Obesity   • H/O: pneumonia   • GERD (gastroesophageal reflux disease)   • Aortic stenosis, severe   • Nonrheumatic mitral valve stenosis   • Heart murmur   • Bronchiectasis   • Abnormal chest CT (Bronchiectasis, partila RML collapse, \"Tree-in-bud\" opacities)      Allergies   Allergen Reactions   • Penicillins Swelling   • Stevia [Stevioside] Swelling     PACK       Current Outpatient Medications:   •  acetaminophen (Tylenol 8 Hour) 650 MG 8 hr tablet, Every 8 (Eight) Hours., Disp: , Rfl:   •  aspirin 81 MG EC tablet, Take 1 tablet by mouth Daily., Disp: , Rfl:   •  AZOPT 1 % ophthalmic suspension, Administer 1 drop to both eyes., Disp: , Rfl:   •  bimatoprost (LUMIGAN) 0.01 % ophthalmic drops, Administer 1 drop to both eyes Every Night., Disp: , Rfl:   •  brimonidine-timolol (Combigan) 0.2-0.5 " % ophthalmic solution, Administer 1 drop to both eyes Every 12 (Twelve) Hours., Disp: , Rfl:   •  Diclofenac Sodium (VOLTAREN) 1 % gel gel, APPLY 2 GRAMS TO THE AFFECTED AREA(S) BY TOPICAL ROUTE 4 TIMES PER DAY (Patient taking differently: As Needed (PRN).), Disp: , Rfl:   •  docusate sodium (Colace) 100 MG capsule, Take 1 capsule by mouth Daily., Disp: , Rfl:   •  donepezil (ARICEPT) 10 MG tablet, Take 1 tablet by mouth Daily., Disp: , Rfl:   •  LORazepam (ATIVAN) 1 MG tablet, Take  by mouth. (Patient taking differently: Take 0.5 tablets by mouth Every Night.), Disp: , Rfl:   •  metFORMIN ER (GLUCOPHAGE-XR) 500 MG 24 hr tablet, Take 1 tablet by mouth Every 12 (Twelve) Hours. (Patient not taking: Reported on 3/17/2025), Disp: , Rfl:   •  mirtazapine (REMERON) 45 MG tablet, Take 1 tablet by mouth Every Night., Disp: , Rfl:   •  naproxen sodium (Aleve) 220 MG tablet, Every 12 (Twelve) Hours. (Patient taking differently: Take 1 tablet by mouth As Needed.), Disp: , Rfl:   •  promethazine (PHENERGAN) 12.5 MG tablet, TAKE 1 OR 2 TABLETS EVERY 8 HOURS (Patient taking differently: Take 1 tablet by mouth As Needed.), Disp: , Rfl:   •  spironolactone (ALDACTONE) 25 MG tablet, Take 1 tablet by mouth Daily., Disp: , Rfl:   •  zinc gluconate 50 MG tablet, Take 1 tablet by mouth Daily., Disp: , Rfl:   MEDICATION LIST AND ALLERGIES REVIEWED.    Family History   Problem Relation Age of Onset   • Hypertension Mother    • Osteoporosis Mother    • Heart disease Father    • No Known Problems Brother      Social History     Tobacco Use   • Smoking status: Never     Passive exposure: Past   • Smokeless tobacco: Never   Vaping Use   • Vaping status: Never Used   Substance Use Topics   • Alcohol use: No   • Drug use: No     Social History     Social History Narrative    Lives in [place], [place]    Non-smoker     FAMILY AND SOCIAL HISTORY REVIEWED.    Review of Systems  IF PRESENT REFER TO SCANNED ROS SHEET FROM SAME DATE  OTHERWISE ROS  "OBTAINED AND NON-CONTRIBUTORY OVER HPI.    /74   Pulse 73   Temp 97 °F (36.1 °C) (Infrared)   Ht 152.4 cm (60\")   Wt 54.4 kg (120 lb)   SpO2 98% Comment: ROOM AIR AT REST  BMI 23.44 kg/m²   Physical Exam  Vitals and nursing note reviewed.   Constitutional:       General: She is not in acute distress.     Appearance: She is well-developed. She is not diaphoretic.   HENT:      Head: Normocephalic and atraumatic.   Neck:      Thyroid: No thyromegaly.   Cardiovascular:      Rate and Rhythm: Normal rate and regular rhythm.      Heart sounds: Murmur heard.   Pulmonary:      Effort: Pulmonary effort is normal.      Breath sounds: Normal breath sounds. No stridor.   Lymphadenopathy:      Cervical: No cervical adenopathy.      Upper Body:      Right upper body: No supraclavicular or epitrochlear adenopathy.      Left upper body: No supraclavicular or epitrochlear adenopathy.   Skin:     General: Skin is warm and dry.   Neurological:      Mental Status: She is alert.      Comments: Got around the office in a wheelchair.   Psychiatric:         Behavior: Behavior normal.       Results     CT scan of the chest done for TAVR planning from 2/13/2025 reviewed on PACS  Scattered areas of bronchiectasis, partial right middle lobe collapse, scattered nodular opacities and tree-in-bud changes  No old imaging studies available for comparison    Immunization History   Administered Date(s) Administered   • Td (TDVAX) 09/30/2003     Problem List       ICD-10-CM ICD-9-CM   1. Bronchiectasis  J47.9 494.0   2. Abnormal chest CT (Bronchiectasis, partila RML collapse, \"Tree-in-bud\" opacities)  R93.89 793.2   3. Aortic stenosis, severe  I35.0 424.1       Discussion     Her family accompanied her to the office  We reviewed her chest imaging together on PACS    She has quite widespread radiographic changes of unknown chronicity  All of these changes may represent chronic changes but without old chest imaging for comparison it is hard " to know    She has chronic cough and sputum production on a daily basis although apparently this has been longstanding with no recent change  No concerning symptoms such as hemoptysis  She has not been on no recent antibiotics for any respiratory tract infections.    Therefore, despite the marked radiographic abnormalities, she is relatively asymptomatic or at least at her baseline    Typically for bronchiectasis we would consider things such as nebulized hypertonic saline, mucus clearance device such as a flutter valve, and sputum samples for atypical infections, such as NTM  However, given her stability from a pulmonary perspective I am not sure that is going to  from a pulmonary perspective    If she did not have her valvular heart disease a bronchoscopy could be considered to evaluate her right middle lobe for proximal obstructing lesion  However that is not to be safe to do with her severe aortic stenosis    At this point I am not sure that her radiographic changes are a contraindication to TAVR.  If she did have an indolent respiratory tract infection, such as due to NTM, I am not sure that that would preclude TAVR placement either  Consultation with infectious disease might be a consideration    I will see her back on an as-needed basis    Moderate level of Medical Decision Making complexity based on 1 undiagnosed new problem or 2 stable chronic conditions, independent interpretation of tests, and/or prescription drug management     Niall Morrissey MD  Note electronically signed    CC: Sagar Mesa MD

## 2025-03-26 ENCOUNTER — TELEPHONE (OUTPATIENT)
Dept: CARDIOLOGY | Facility: CLINIC | Age: 89
End: 2025-03-26
Payer: MEDICARE

## 2025-03-26 NOTE — TELEPHONE ENCOUNTER
Spoke with daughter, at her appt with Dr. Marley on 03/17/25, she weighed 117.6 lbs, at her pulmonary appt on 03/25/25, she weighed 120.0 lbs.  Her concern is that patient has been atypical in symptoms during the TAVR work up process and she is concerned she is beginning to retain fluid.  She reports at home her weight fluctuates between 114.8 to 117.0.      Patient does not have shortness of breath, oxygen sat is 98%, denies chest pain.  No edema, patient does utilize compression stocking.  She reports patient does have some occasional dizziness when she stands.  Discussed with Dr Marley who advises a weight gain of 5 pounds in 2 days is a figure to watch for.  Also if she begins to have shortness of breath or edema, to contact office.

## 2025-03-28 ENCOUNTER — PREP FOR SURGERY (OUTPATIENT)
Dept: OTHER | Facility: HOSPITAL | Age: 89
End: 2025-03-28
Payer: MEDICARE

## 2025-03-28 DIAGNOSIS — I35.0 AORTIC STENOSIS, SEVERE: Primary | ICD-10-CM

## 2025-03-28 RX ORDER — NITROGLYCERIN 0.4 MG/1
0.4 TABLET SUBLINGUAL
OUTPATIENT
Start: 2025-04-14

## 2025-03-28 RX ORDER — ASPIRIN 325 MG
325 TABLET ORAL NIGHTLY
OUTPATIENT
Start: 2025-04-11 | End: 2025-04-12

## 2025-03-28 RX ORDER — CHLORHEXIDINE GLUCONATE 500 MG/1
1 CLOTH TOPICAL EVERY 12 HOURS PRN
OUTPATIENT
Start: 2025-04-14

## 2025-03-28 RX ORDER — CHLORHEXIDINE GLUCONATE 500 MG/1
CLOTH TOPICAL EVERY 12 HOURS PRN
OUTPATIENT
Start: 2025-04-14

## 2025-03-28 RX ORDER — CHLORHEXIDINE GLUCONATE ORAL RINSE 1.2 MG/ML
15 SOLUTION DENTAL ONCE
OUTPATIENT
Start: 2025-04-14 | End: 2025-04-14

## 2025-04-02 ENCOUNTER — DOCUMENTATION (OUTPATIENT)
Dept: CARDIOLOGY | Facility: CLINIC | Age: 89
End: 2025-04-02
Payer: MEDICARE

## 2025-04-02 NOTE — PROGRESS NOTES
Patient to see ID per Dr. Benjamin, referral placed by CT surgery office. Spoke with daughter about referral per CT surgery.

## 2025-04-11 ENCOUNTER — HOSPITAL ENCOUNTER (OUTPATIENT)
Dept: GENERAL RADIOLOGY | Facility: HOSPITAL | Age: 89
Discharge: HOME | End: 2025-04-11
Payer: MEDICARE

## 2025-04-11 ENCOUNTER — ANESTHESIA EVENT (OUTPATIENT)
Dept: PERIOP | Facility: HOSPITAL | Age: 89
End: 2025-04-11
Payer: MEDICARE

## 2025-04-11 ENCOUNTER — PRE-ADMISSION TESTING (OUTPATIENT)
Dept: PREADMISSION TESTING | Facility: HOSPITAL | Age: 89
DRG: 267 | End: 2025-04-11
Payer: MEDICARE

## 2025-04-11 ENCOUNTER — HOSPITAL ENCOUNTER (OUTPATIENT)
Dept: PULMONOLOGY | Facility: HOSPITAL | Age: 89
Discharge: HOME | End: 2025-04-11
Payer: MEDICARE

## 2025-04-11 ENCOUNTER — DOCUMENTATION (OUTPATIENT)
Dept: CARDIOLOGY | Facility: HOSPITAL | Age: 89
End: 2025-04-11
Payer: MEDICARE

## 2025-04-11 VITALS — WEIGHT: 118.61 LBS | BODY MASS INDEX: 23.29 KG/M2 | HEIGHT: 60 IN

## 2025-04-11 DIAGNOSIS — I35.0 AORTIC STENOSIS, SEVERE: ICD-10-CM

## 2025-04-11 DIAGNOSIS — I35.9 AORTIC VALVE DISORDER: ICD-10-CM

## 2025-04-11 LAB
ABO GROUP BLD: NORMAL
ALBUMIN SERPL-MCNC: 4 G/DL (ref 3.5–5.2)
ALBUMIN/GLOB SERPL: 1.5 G/DL
ALP SERPL-CCNC: 89 U/L (ref 39–117)
ALT SERPL W P-5'-P-CCNC: 10 U/L (ref 1–33)
AMPHET+METHAMPHET UR QL: NEGATIVE
AMPHETAMINES UR QL: NEGATIVE
ANION GAP SERPL CALCULATED.3IONS-SCNC: 10 MMOL/L (ref 5–15)
ANTI-FYA: NORMAL
APTT PPP: 30.1 SECONDS (ref 22–39)
AST SERPL-CCNC: 18 U/L (ref 1–32)
BARBITURATES UR QL SCN: NEGATIVE
BASOPHILS # BLD AUTO: 0.1 10*3/MM3 (ref 0–0.2)
BASOPHILS NFR BLD AUTO: 1.6 % (ref 0–1.5)
BENZODIAZ UR QL SCN: POSITIVE
BILIRUB SERPL-MCNC: 0.3 MG/DL (ref 0–1.2)
BLD GP AB SCN SERPL QL: POSITIVE
BUN SERPL-MCNC: 22 MG/DL (ref 8–23)
BUN/CREAT SERPL: 32.4 (ref 7–25)
BUPRENORPHINE SERPL-MCNC: NEGATIVE NG/ML
CALCIUM SPEC-SCNC: 9.6 MG/DL (ref 8.6–10.5)
CANNABINOIDS SERPL QL: NEGATIVE
CHLORIDE SERPL-SCNC: 98 MMOL/L (ref 98–107)
CO2 SERPL-SCNC: 26 MMOL/L (ref 22–29)
COCAINE UR QL: NEGATIVE
CREAT SERPL-MCNC: 0.68 MG/DL (ref 0.57–1)
DEPRECATED RDW RBC AUTO: 49.4 FL (ref 37–54)
EGFRCR SERPLBLD CKD-EPI 2021: 84.4 ML/MIN/1.73
EOSINOPHIL # BLD AUTO: 0.35 10*3/MM3 (ref 0–0.4)
EOSINOPHIL NFR BLD AUTO: 5.7 % (ref 0.3–6.2)
ERYTHROCYTE [DISTWIDTH] IN BLOOD BY AUTOMATED COUNT: 14.5 % (ref 12.3–15.4)
FENTANYL UR-MCNC: NEGATIVE NG/ML
GLOBULIN UR ELPH-MCNC: 2.7 GM/DL
GLUCOSE SERPL-MCNC: 178 MG/DL (ref 65–99)
HBA1C MFR BLD: 5.9 % (ref 4.8–5.6)
HCT VFR BLD AUTO: 37.9 % (ref 34–46.6)
HGB BLD-MCNC: 12.1 G/DL (ref 12–15.9)
IMM GRANULOCYTES # BLD AUTO: 0.01 10*3/MM3 (ref 0–0.05)
IMM GRANULOCYTES NFR BLD AUTO: 0.2 % (ref 0–0.5)
INR PPP: 1.11 (ref 0.89–1.12)
LYMPHOCYTES # BLD AUTO: 0.91 10*3/MM3 (ref 0.7–3.1)
LYMPHOCYTES NFR BLD AUTO: 14.7 % (ref 19.6–45.3)
MAGNESIUM SERPL-MCNC: 1.9 MG/DL (ref 1.6–2.4)
MCH RBC QN AUTO: 29.7 PG (ref 26.6–33)
MCHC RBC AUTO-ENTMCNC: 31.9 G/DL (ref 31.5–35.7)
MCV RBC AUTO: 92.9 FL (ref 79–97)
METHADONE UR QL SCN: NEGATIVE
MONOCYTES # BLD AUTO: 0.61 10*3/MM3 (ref 0.1–0.9)
MONOCYTES NFR BLD AUTO: 9.9 % (ref 5–12)
NEUTROPHILS NFR BLD AUTO: 4.21 10*3/MM3 (ref 1.7–7)
NEUTROPHILS NFR BLD AUTO: 67.9 % (ref 42.7–76)
NRBC BLD AUTO-RTO: 0 /100 WBC (ref 0–0.2)
OPIATES UR QL: NEGATIVE
OXYCODONE UR QL SCN: NEGATIVE
PA ADP PRP-ACNC: 273 PRU
PCP UR QL SCN: NEGATIVE
PLATELET # BLD AUTO: 236 10*3/MM3 (ref 140–450)
PMV BLD AUTO: 12.4 FL (ref 6–12)
POTASSIUM SERPL-SCNC: 4.5 MMOL/L (ref 3.5–5.2)
PROT SERPL-MCNC: 6.7 G/DL (ref 6–8.5)
PROTHROMBIN TIME: 15 SECONDS (ref 12.2–15.3)
RBC # BLD AUTO: 4.08 10*6/MM3 (ref 3.77–5.28)
RH BLD: POSITIVE
SODIUM SERPL-SCNC: 134 MMOL/L (ref 136–145)
T&S EXPIRATION DATE: NORMAL
TRICYCLICS UR QL SCN: NEGATIVE
WBC NRBC COR # BLD AUTO: 6.19 10*3/MM3 (ref 3.4–10.8)

## 2025-04-11 PROCEDURE — 71046 X-RAY EXAM CHEST 2 VIEWS: CPT

## 2025-04-11 PROCEDURE — 80053 COMPREHEN METABOLIC PANEL: CPT

## 2025-04-11 PROCEDURE — 83735 ASSAY OF MAGNESIUM: CPT

## 2025-04-11 PROCEDURE — 86900 BLOOD TYPING SEROLOGIC ABO: CPT

## 2025-04-11 PROCEDURE — 85025 COMPLETE CBC W/AUTO DIFF WBC: CPT

## 2025-04-11 PROCEDURE — 86870 RBC ANTIBODY IDENTIFICATION: CPT

## 2025-04-11 PROCEDURE — 83036 HEMOGLOBIN GLYCOSYLATED A1C: CPT

## 2025-04-11 PROCEDURE — 85610 PROTHROMBIN TIME: CPT

## 2025-04-11 PROCEDURE — 94010 BREATHING CAPACITY TEST: CPT

## 2025-04-11 PROCEDURE — 80307 DRUG TEST PRSMV CHEM ANLYZR: CPT

## 2025-04-11 PROCEDURE — 86901 BLOOD TYPING SEROLOGIC RH(D): CPT

## 2025-04-11 PROCEDURE — 85730 THROMBOPLASTIN TIME PARTIAL: CPT

## 2025-04-11 PROCEDURE — 36415 COLL VENOUS BLD VENIPUNCTURE: CPT

## 2025-04-11 PROCEDURE — 86850 RBC ANTIBODY SCREEN: CPT

## 2025-04-11 PROCEDURE — 85576 BLOOD PLATELET AGGREGATION: CPT

## 2025-04-11 RX ORDER — CHLORHEXIDINE GLUCONATE 500 MG/1
1 CLOTH TOPICAL EVERY 12 HOURS PRN
Status: ACTIVE | OUTPATIENT
Start: 2025-04-14

## 2025-04-11 RX ORDER — ASPIRIN 325 MG
325 TABLET ORAL NIGHTLY
Status: ACTIVE | OUTPATIENT
Start: 2025-04-11 | End: 2025-04-12

## 2025-04-11 NOTE — PAT
An arrival time for procedure was not provided during PAT visit. If patient had any questions or concerns about their arrival time, they were instructed to contact their surgeon/physician.  Additionally, if the patient referred to an arrival time that was acquired from their my chart account, patient was encouraged to verify that time with their surgeon/physician. Arrival times are NOT provided in Pre Admission Testing Department.    Patient denies any current skin issues.     Patient to apply Chlorhexadine wipes  to surgical area (as instructed) the night before procedure and the AM of procedure. Wipes provided.    Patient viewed general PAT education video as instructed in their preoperative information received from their surgeon.  Patient stated the general PAT education video was viewed in its entirety and survey completed.  Copies of PAT general education handouts (Incentive Spirometry, Meds to Beds Program, Patient Belongings, Pre-op skin preparation instructions, Blood Glucose testing, Visitor policy, Surgery FAQ, Code H) distributed to patient if not printed. Education related to the PAT pass and skin preparation for surgery (if applicable) completed in PAT as a reinforcement to PAT education video. Patient instructed to return PAT pass provided today as well as completed skin preparation sheet (if applicable) on the day of procedure.     Additionally if patient had not viewed video yet but intended to view it at home or in our waiting area, then referred them to the handout with QR code/link provided during PAT visit.  Encouraged patient/family to read PAT general education handouts thoroughly and notify PAT staff with any questions or concerns. Patient verbalized understanding of all information and priority content.    Blood bank bracelet applied to patient during Pre Admission Testing visit.  Patient instructed not to remove from arm until after procedure and they are discharged from the hospital.   Explained to patient that they may shower and get the bracelet wet, but not to immerse under water for longer periods (bathing, swimming, hand dishwashing, etc).  Patient verbalized understanding.    Bactroban to be applied to each nostril during PAT visit if surgery the following day.  If surgery NOT the following day, then Bactroban supplied to patient with instructions both written and verbally to insert Bactroban into each nares the night before surgery.    Instructed patient to take 325 mg of Asprin (or four tablets of the 81 mg strength) the night before heart surgery as per CT surgeon's order.  Patient and/or family verbalized understanding.    Patient directed to Radiology Department for CXR after Pre Admission Testing Appointment.     Patient directed to Respiratory Department after Pre Admission Testing visit for Pulmonary Function Test.    Patient instructed to drink 20 ounces of Gatorade or Gatorlyte (if diabetic) and it needs to be completed 1 hour (for Main OR patients) or 2 hours (scheduled  section & [facility] patients) before given arrival time for procedure (NO RED Gatorade and NO Gatorade Zero).    Patient verbalized understanding.    EKG ON CHART FROM 25, PATIENT DENIES CHEST PAIN AND SHORTNESS OF BREATH.

## 2025-04-11 NOTE — PROGRESS NOTES
Met with patient and family in PAT. We discussed TAVR, hospitalization and recovery expectations. Patient and family do not want to undergo a sternotomy under any circumstances, they have discussed this with the [facility] team. Overall she has felt well and has been able to complete ADLs with mild-moderate difficulty, mostly complains of increased fatigue and trace-mild LE edema. Weight stable +/-4lb for 2 mo. Daughter reports a reduced appetite, but providing regular meals and snacks, as well as protein supplements to maintain weight. [place].     Will follow up after TAVR.     NYHA II  [facility] 58/70  5MWT- Using wheelchair for apt today.

## 2025-04-14 ENCOUNTER — ANCILLARY PROCEDURE (OUTPATIENT)
Dept: PERIOP | Facility: HOSPITAL | Age: 89
DRG: 267 | End: 2025-04-14
Payer: MEDICARE

## 2025-04-14 ENCOUNTER — HOSPITAL ENCOUNTER (INPATIENT)
Facility: HOSPITAL | Age: 89
LOS: 2 days | Discharge: HOME | DRG: 267 | End: 2025-04-16
Attending: THORACIC SURGERY (CARDIOTHORACIC VASCULAR SURGERY) | Admitting: THORACIC SURGERY (CARDIOTHORACIC VASCULAR SURGERY)
Payer: MEDICARE

## 2025-04-14 ENCOUNTER — ANESTHESIA EVENT CONVERTED (OUTPATIENT)
Dept: ANESTHESIOLOGY | Facility: HOSPITAL | Age: 89
DRG: 267 | End: 2025-04-14
Payer: MEDICARE

## 2025-04-14 ENCOUNTER — ANESTHESIA (OUTPATIENT)
Dept: PERIOP | Facility: HOSPITAL | Age: 89
End: 2025-04-14
Payer: MEDICARE

## 2025-04-14 DIAGNOSIS — I44.2 CHB (COMPLETE HEART BLOCK): Primary | ICD-10-CM

## 2025-04-14 DIAGNOSIS — I34.2 NONRHEUMATIC MITRAL VALVE STENOSIS: ICD-10-CM

## 2025-04-14 DIAGNOSIS — Z95.3 S/P TAVR (TRANSCATHETER AORTIC VALVE REPLACEMENT), BIOPROSTHETIC: ICD-10-CM

## 2025-04-14 DIAGNOSIS — I35.0 AORTIC STENOSIS, SEVERE: ICD-10-CM

## 2025-04-14 DIAGNOSIS — I35.0 AORTIC STENOSIS, SEVERE: Primary | ICD-10-CM

## 2025-04-14 PROBLEM — K21.9 GERD (GASTROESOPHAGEAL REFLUX DISEASE): Chronic | Status: ACTIVE | Noted: 2017-05-04

## 2025-04-14 PROBLEM — J47.9 IDIOPATHIC BRONCHIECTASIS: Chronic | Status: ACTIVE | Noted: 2025-03-25

## 2025-04-14 LAB
ABO GROUP BLD: NORMAL
ANION GAP SERPL CALCULATED.3IONS-SCNC: 7 MMOL/L (ref 5–15)
ANTI-FYA: NORMAL
APTT PPP: 33.5 SECONDS (ref 22–39)
BLD GP AB SCN SERPL QL: POSITIVE
BUN SERPL-MCNC: 19 MG/DL (ref 8–23)
BUN/CREAT SERPL: 33.3 (ref 7–25)
CALCIUM SPEC-SCNC: 8.2 MG/DL (ref 8.6–10.5)
CHLORIDE SERPL-SCNC: 102 MMOL/L (ref 98–107)
CO2 SERPL-SCNC: 24 MMOL/L (ref 22–29)
CREAT SERPL-MCNC: 0.57 MG/DL (ref 0.57–1)
DEPRECATED RDW RBC AUTO: 48.9 FL (ref 37–54)
EGFRCR SERPLBLD CKD-EPI 2021: 88.1 ML/MIN/1.73
ERYTHROCYTE [DISTWIDTH] IN BLOOD BY AUTOMATED COUNT: 14.3 % (ref 12.3–15.4)
GLUCOSE BLDC GLUCOMTR-MCNC: 102 MG/DL (ref 70–130)
GLUCOSE BLDC GLUCOMTR-MCNC: 104 MG/DL (ref 70–130)
GLUCOSE SERPL-MCNC: 121 MG/DL (ref 65–99)
HCT VFR BLD AUTO: 34.8 % (ref 34–46.6)
HGB BLD-MCNC: 11.2 G/DL (ref 12–15.9)
MCH RBC QN AUTO: 30.1 PG (ref 26.6–33)
MCHC RBC AUTO-ENTMCNC: 32.2 G/DL (ref 31.5–35.7)
MCV RBC AUTO: 93.5 FL (ref 79–97)
PLATELET # BLD AUTO: 174 10*3/MM3 (ref 140–450)
PMV BLD AUTO: 12.1 FL (ref 6–12)
POTASSIUM SERPL-SCNC: 4 MMOL/L (ref 3.5–5.2)
QT INTERVAL: 544 MS
QTC INTERVAL: 620 MS
RBC # BLD AUTO: 3.72 10*6/MM3 (ref 3.77–5.28)
RH BLD: POSITIVE
SODIUM SERPL-SCNC: 133 MMOL/L (ref 136–145)
T&S EXPIRATION DATE: NORMAL
WBC NRBC COR # BLD AUTO: 4.75 10*3/MM3 (ref 3.4–10.8)

## 2025-04-14 PROCEDURE — 02RF38Z REPLACEMENT OF AORTIC VALVE WITH ZOOPLASTIC TISSUE, PERCUTANEOUS APPROACH: ICD-10-PCS | Performed by: THORACIC SURGERY (CARDIOTHORACIC VASCULAR SURGERY)

## 2025-04-14 PROCEDURE — 85014 HEMATOCRIT: CPT

## 2025-04-14 PROCEDURE — 25810000003 SODIUM CHLORIDE 0.9 % SOLUTION: Performed by: ANESTHESIOLOGY

## 2025-04-14 PROCEDURE — 82330 ASSAY OF CALCIUM: CPT

## 2025-04-14 PROCEDURE — C1769 GUIDE WIRE: HCPCS | Performed by: THORACIC SURGERY (CARDIOTHORACIC VASCULAR SURGERY)

## 2025-04-14 PROCEDURE — 25810000003 SODIUM CHLORIDE 0.9 % SOLUTION: Performed by: NURSE ANESTHETIST, CERTIFIED REGISTERED

## 2025-04-14 PROCEDURE — 93355 ECHO TRANSESOPHAGEAL (TEE): CPT

## 2025-04-14 PROCEDURE — 25010000002 PHENYLEPHRINE 10 MG/ML SOLUTION 1 ML VIAL: Performed by: NURSE ANESTHETIST, CERTIFIED REGISTERED

## 2025-04-14 PROCEDURE — C1894 INTRO/SHEATH, NON-LASER: HCPCS | Performed by: THORACIC SURGERY (CARDIOTHORACIC VASCULAR SURGERY)

## 2025-04-14 PROCEDURE — 84132 ASSAY OF SERUM POTASSIUM: CPT

## 2025-04-14 PROCEDURE — 25010000002 HEPARIN (PORCINE) PER 1000 UNITS: Performed by: THORACIC SURGERY (CARDIOTHORACIC VASCULAR SURGERY)

## 2025-04-14 PROCEDURE — 25810000003 SODIUM CHLORIDE 0.9 % SOLUTION 250 ML FLEX CONT: Performed by: NURSE ANESTHETIST, CERTIFIED REGISTERED

## 2025-04-14 PROCEDURE — 25010000002 HEPARIN (PORCINE) PER 1000 UNITS: Performed by: NURSE ANESTHETIST, CERTIFIED REGISTERED

## 2025-04-14 PROCEDURE — C1889 IMPLANT/INSERT DEVICE, NOC: HCPCS | Performed by: THORACIC SURGERY (CARDIOTHORACIC VASCULAR SURGERY)

## 2025-04-14 PROCEDURE — 25010000002 DEXAMETHASONE PER 1 MG: Performed by: NURSE ANESTHETIST, CERTIFIED REGISTERED

## 2025-04-14 PROCEDURE — 86870 RBC ANTIBODY IDENTIFICATION: CPT | Performed by: THORACIC SURGERY (CARDIOTHORACIC VASCULAR SURGERY)

## 2025-04-14 PROCEDURE — 25010000002 VANCOMYCIN 750 MG RECONSTITUTED SOLUTION 1 EACH VIAL: Performed by: PHYSICIAN ASSISTANT

## 2025-04-14 PROCEDURE — 25810000003 SODIUM CHLORIDE PER 500 ML: Performed by: THORACIC SURGERY (CARDIOTHORACIC VASCULAR SURGERY)

## 2025-04-14 PROCEDURE — 25010000002 SUGAMMADEX 200 MG/2ML SOLUTION: Performed by: NURSE ANESTHETIST, CERTIFIED REGISTERED

## 2025-04-14 PROCEDURE — 25510000001 IOPAMIDOL PER 1 ML: Performed by: THORACIC SURGERY (CARDIOTHORACIC VASCULAR SURGERY)

## 2025-04-14 PROCEDURE — 33361 REPLACE AORTIC VALVE PERQ: CPT | Performed by: THORACIC SURGERY (CARDIOTHORACIC VASCULAR SURGERY)

## 2025-04-14 PROCEDURE — 86920 COMPATIBILITY TEST SPIN: CPT

## 2025-04-14 PROCEDURE — 85347 COAGULATION TIME ACTIVATED: CPT

## 2025-04-14 PROCEDURE — 99222 1ST HOSP IP/OBS MODERATE 55: CPT

## 2025-04-14 PROCEDURE — 25810000003 SODIUM CHLORIDE 0.9 % SOLUTION 250 ML FLEX CONT: Performed by: PHYSICIAN ASSISTANT

## 2025-04-14 PROCEDURE — 25010000002 LIDOCAINE PF 1% 1 % SOLUTION: Performed by: ANESTHESIOLOGY

## 2025-04-14 PROCEDURE — 82803 BLOOD GASES ANY COMBINATION: CPT

## 2025-04-14 PROCEDURE — 86850 RBC ANTIBODY SCREEN: CPT | Performed by: THORACIC SURGERY (CARDIOTHORACIC VASCULAR SURGERY)

## 2025-04-14 PROCEDURE — 82947 ASSAY GLUCOSE BLOOD QUANT: CPT

## 2025-04-14 PROCEDURE — 85027 COMPLETE CBC AUTOMATED: CPT | Performed by: PHYSICIAN ASSISTANT

## 2025-04-14 PROCEDURE — 85730 THROMBOPLASTIN TIME PARTIAL: CPT | Performed by: PHYSICIAN ASSISTANT

## 2025-04-14 PROCEDURE — 25010000002 ONDANSETRON PER 1 MG: Performed by: NURSE ANESTHETIST, CERTIFIED REGISTERED

## 2025-04-14 PROCEDURE — 25010000002 ESMOLOL 100 MG/10ML SOLUTION: Performed by: NURSE ANESTHETIST, CERTIFIED REGISTERED

## 2025-04-14 PROCEDURE — 93005 ELECTROCARDIOGRAM TRACING: CPT | Performed by: PHYSICIAN ASSISTANT

## 2025-04-14 PROCEDURE — C1725 CATH, TRANSLUMIN NON-LASER: HCPCS | Performed by: THORACIC SURGERY (CARDIOTHORACIC VASCULAR SURGERY)

## 2025-04-14 PROCEDURE — 25010000002 NICARDIPINE 2.5 MG/ML SOLUTION: Performed by: NURSE ANESTHETIST, CERTIFIED REGISTERED

## 2025-04-14 PROCEDURE — 86900 BLOOD TYPING SEROLOGIC ABO: CPT | Performed by: THORACIC SURGERY (CARDIOTHORACIC VASCULAR SURGERY)

## 2025-04-14 PROCEDURE — 84295 ASSAY OF SERUM SODIUM: CPT

## 2025-04-14 PROCEDURE — 86922 COMPATIBILITY TEST ANTIGLOB: CPT

## 2025-04-14 PROCEDURE — 99232 SBSQ HOSP IP/OBS MODERATE 35: CPT | Performed by: INTERNAL MEDICINE

## 2025-04-14 PROCEDURE — B24BZZ4 ULTRASONOGRAPHY OF HEART WITH AORTA, TRANSESOPHAGEAL: ICD-10-PCS | Performed by: THORACIC SURGERY (CARDIOTHORACIC VASCULAR SURGERY)

## 2025-04-14 PROCEDURE — 82948 REAGENT STRIP/BLOOD GLUCOSE: CPT

## 2025-04-14 PROCEDURE — C1887 CATHETER, GUIDING: HCPCS | Performed by: THORACIC SURGERY (CARDIOTHORACIC VASCULAR SURGERY)

## 2025-04-14 PROCEDURE — 33361 REPLACE AORTIC VALVE PERQ: CPT | Performed by: INTERNAL MEDICINE

## 2025-04-14 PROCEDURE — C1760 CLOSURE DEV, VASC: HCPCS | Performed by: THORACIC SURGERY (CARDIOTHORACIC VASCULAR SURGERY)

## 2025-04-14 PROCEDURE — 25010000002 PROTAMINE SULFATE PER 10 MG: Performed by: NURSE ANESTHETIST, CERTIFIED REGISTERED

## 2025-04-14 PROCEDURE — 80048 BASIC METABOLIC PNL TOTAL CA: CPT | Performed by: PHYSICIAN ASSISTANT

## 2025-04-14 PROCEDURE — 86901 BLOOD TYPING SEROLOGIC RH(D): CPT | Performed by: THORACIC SURGERY (CARDIOTHORACIC VASCULAR SURGERY)

## 2025-04-14 PROCEDURE — 5A1223Z PERFORMANCE OF CARDIAC PACING, CONTINUOUS: ICD-10-PCS | Performed by: THORACIC SURGERY (CARDIOTHORACIC VASCULAR SURGERY)

## 2025-04-14 DEVICE — KT VLV AORT TRNSCATH SAPIEN3 W/ULTRA/DS 14F 20MM: Type: IMPLANTABLE DEVICE | Site: HEART | Status: FUNCTIONAL

## 2025-04-14 RX ORDER — ROCURONIUM BROMIDE 10 MG/ML
INJECTION, SOLUTION INTRAVENOUS AS NEEDED
Status: DISCONTINUED | OUTPATIENT
Start: 2025-04-14 | End: 2025-04-14 | Stop reason: SURG

## 2025-04-14 RX ORDER — HYDRALAZINE HYDROCHLORIDE 20 MG/ML
5 INJECTION INTRAMUSCULAR; INTRAVENOUS
Status: CANCELLED | OUTPATIENT
Start: 2025-04-14

## 2025-04-14 RX ORDER — MAGNESIUM HYDROXIDE 1200 MG/15ML
LIQUID ORAL AS NEEDED
Status: DISCONTINUED | OUTPATIENT
Start: 2025-04-14 | End: 2025-04-14 | Stop reason: HOSPADM

## 2025-04-14 RX ORDER — SODIUM CHLORIDE 9 MG/ML
100 INJECTION, SOLUTION INTRAVENOUS CONTINUOUS
Status: ACTIVE | OUTPATIENT
Start: 2025-04-14 | End: 2025-04-14

## 2025-04-14 RX ORDER — LABETALOL HYDROCHLORIDE 5 MG/ML
5 INJECTION, SOLUTION INTRAVENOUS
Status: CANCELLED | OUTPATIENT
Start: 2025-04-14

## 2025-04-14 RX ORDER — DONEPEZIL HYDROCHLORIDE 10 MG/1
10 TABLET, FILM COATED ORAL NIGHTLY
Status: DISCONTINUED | OUTPATIENT
Start: 2025-04-14 | End: 2025-04-14

## 2025-04-14 RX ORDER — MIDAZOLAM HYDROCHLORIDE 1 MG/ML
0.5 INJECTION, SOLUTION INTRAMUSCULAR; INTRAVENOUS
Status: DISCONTINUED | OUTPATIENT
Start: 2025-04-14 | End: 2025-04-14 | Stop reason: HOSPADM

## 2025-04-14 RX ORDER — HYDRALAZINE HYDROCHLORIDE 20 MG/ML
10 INJECTION INTRAMUSCULAR; INTRAVENOUS EVERY 6 HOURS PRN
Status: DISCONTINUED | OUTPATIENT
Start: 2025-04-14 | End: 2025-04-16 | Stop reason: HOSPADM

## 2025-04-14 RX ORDER — CHLORHEXIDINE GLUCONATE 500 MG/1
CLOTH TOPICAL EVERY 12 HOURS PRN
Status: DISCONTINUED | OUTPATIENT
Start: 2025-04-14 | End: 2025-04-14 | Stop reason: HOSPADM

## 2025-04-14 RX ORDER — DEXAMETHASONE SODIUM PHOSPHATE 4 MG/ML
INJECTION, SOLUTION INTRA-ARTICULAR; INTRALESIONAL; INTRAMUSCULAR; INTRAVENOUS; SOFT TISSUE AS NEEDED
Status: DISCONTINUED | OUTPATIENT
Start: 2025-04-14 | End: 2025-04-14 | Stop reason: SURG

## 2025-04-14 RX ORDER — FAMOTIDINE 10 MG/ML
20 INJECTION, SOLUTION INTRAVENOUS ONCE
Status: DISCONTINUED | OUTPATIENT
Start: 2025-04-14 | End: 2025-04-14

## 2025-04-14 RX ORDER — DONEPEZIL HYDROCHLORIDE 10 MG/1
10 TABLET, FILM COATED ORAL DAILY
Status: DISCONTINUED | OUTPATIENT
Start: 2025-04-14 | End: 2025-04-16 | Stop reason: HOSPADM

## 2025-04-14 RX ORDER — PROMETHAZINE HYDROCHLORIDE 25 MG/1
25 SUPPOSITORY RECTAL ONCE AS NEEDED
Status: CANCELLED | OUTPATIENT
Start: 2025-04-14

## 2025-04-14 RX ORDER — SODIUM CHLORIDE, SODIUM LACTATE, POTASSIUM CHLORIDE, CALCIUM CHLORIDE 600; 310; 30; 20 MG/100ML; MG/100ML; MG/100ML; MG/100ML
9 INJECTION, SOLUTION INTRAVENOUS CONTINUOUS
Status: CANCELLED | OUTPATIENT
Start: 2025-04-14 | End: 2025-04-15

## 2025-04-14 RX ORDER — HEPARIN SODIUM 1000 [USP'U]/ML
INJECTION, SOLUTION INTRAVENOUS; SUBCUTANEOUS AS NEEDED
Status: DISCONTINUED | OUTPATIENT
Start: 2025-04-14 | End: 2025-04-14 | Stop reason: SURG

## 2025-04-14 RX ORDER — IOPAMIDOL 755 MG/ML
INJECTION, SOLUTION INTRAVASCULAR AS NEEDED
Status: DISCONTINUED | OUTPATIENT
Start: 2025-04-14 | End: 2025-04-14 | Stop reason: HOSPADM

## 2025-04-14 RX ORDER — ETOMIDATE 2 MG/ML
INJECTION INTRAVENOUS AS NEEDED
Status: DISCONTINUED | OUTPATIENT
Start: 2025-04-14 | End: 2025-04-14 | Stop reason: SURG

## 2025-04-14 RX ORDER — NICARDIPINE HYDROCHLORIDE 2.5 MG/ML
INJECTION INTRAVENOUS CONTINUOUS PRN
Status: DISCONTINUED | OUTPATIENT
Start: 2025-04-14 | End: 2025-04-14 | Stop reason: SURG

## 2025-04-14 RX ORDER — ESMOLOL HYDROCHLORIDE 10 MG/ML
INJECTION INTRAVENOUS AS NEEDED
Status: DISCONTINUED | OUTPATIENT
Start: 2025-04-14 | End: 2025-04-14 | Stop reason: SURG

## 2025-04-14 RX ORDER — DROPERIDOL 2.5 MG/ML
0.62 INJECTION, SOLUTION INTRAMUSCULAR; INTRAVENOUS
Status: CANCELLED | OUTPATIENT
Start: 2025-04-14

## 2025-04-14 RX ORDER — BRINZOLAMIDE 10 MG/ML
1 SUSPENSION/ DROPS OPHTHALMIC EVERY 12 HOURS
Status: DISCONTINUED | OUTPATIENT
Start: 2025-04-14 | End: 2025-04-16 | Stop reason: HOSPADM

## 2025-04-14 RX ORDER — LABETALOL HYDROCHLORIDE 5 MG/ML
10 INJECTION, SOLUTION INTRAVENOUS
Status: DISCONTINUED | OUTPATIENT
Start: 2025-04-14 | End: 2025-04-16 | Stop reason: HOSPADM

## 2025-04-14 RX ORDER — HYDROMORPHONE HYDROCHLORIDE 1 MG/ML
0.5 INJECTION, SOLUTION INTRAMUSCULAR; INTRAVENOUS; SUBCUTANEOUS
Refills: 0 | Status: CANCELLED | OUTPATIENT
Start: 2025-04-14

## 2025-04-14 RX ORDER — ACETAMINOPHEN 325 MG/1
650 TABLET ORAL EVERY 6 HOURS PRN
Status: DISCONTINUED | OUTPATIENT
Start: 2025-04-14 | End: 2025-04-16 | Stop reason: HOSPADM

## 2025-04-14 RX ORDER — NITROGLYCERIN 0.4 MG/1
0.4 TABLET SUBLINGUAL
Status: DISCONTINUED | OUTPATIENT
Start: 2025-04-14 | End: 2025-04-16 | Stop reason: HOSPADM

## 2025-04-14 RX ORDER — SODIUM CHLORIDE, SODIUM LACTATE, POTASSIUM CHLORIDE, CALCIUM CHLORIDE 600; 310; 30; 20 MG/100ML; MG/100ML; MG/100ML; MG/100ML
9 INJECTION, SOLUTION INTRAVENOUS CONTINUOUS
Status: DISCONTINUED | OUTPATIENT
Start: 2025-04-15 | End: 2025-04-14

## 2025-04-14 RX ORDER — BRIMONIDINE TARTRATE AND TIMOLOL MALEATE 2; 5 MG/ML; MG/ML
1 SOLUTION OPHTHALMIC EVERY 12 HOURS
Status: DISCONTINUED | OUTPATIENT
Start: 2025-04-14 | End: 2025-04-16 | Stop reason: HOSPADM

## 2025-04-14 RX ORDER — CHLORHEXIDINE GLUCONATE ORAL RINSE 1.2 MG/ML
15 SOLUTION DENTAL ONCE
Status: COMPLETED | OUTPATIENT
Start: 2025-04-14 | End: 2025-04-14

## 2025-04-14 RX ORDER — DROPERIDOL 2.5 MG/ML
0.62 INJECTION, SOLUTION INTRAMUSCULAR; INTRAVENOUS ONCE AS NEEDED
Status: CANCELLED | OUTPATIENT
Start: 2025-04-14

## 2025-04-14 RX ORDER — NITROGLYCERIN 0.4 MG/1
0.4 TABLET SUBLINGUAL
Status: DISCONTINUED | OUTPATIENT
Start: 2025-04-14 | End: 2025-04-14 | Stop reason: HOSPADM

## 2025-04-14 RX ORDER — SODIUM CHLORIDE 9 MG/ML
INJECTION, SOLUTION INTRAVENOUS AS NEEDED
Status: DISCONTINUED | OUTPATIENT
Start: 2025-04-14 | End: 2025-04-14 | Stop reason: HOSPADM

## 2025-04-14 RX ORDER — HYDROCODONE BITARTRATE AND ACETAMINOPHEN 5; 325 MG/1; MG/1
1 TABLET ORAL ONCE AS NEEDED
Refills: 0 | Status: CANCELLED | OUTPATIENT
Start: 2025-04-14 | End: 2025-04-24

## 2025-04-14 RX ORDER — SODIUM CHLORIDE 9 MG/ML
9 INJECTION, SOLUTION INTRAVENOUS CONTINUOUS
Status: ACTIVE | OUTPATIENT
Start: 2025-04-14 | End: 2025-04-14

## 2025-04-14 RX ORDER — MIRTAZAPINE 15 MG/1
45 TABLET, FILM COATED ORAL NIGHTLY
Status: DISCONTINUED | OUTPATIENT
Start: 2025-04-14 | End: 2025-04-16 | Stop reason: HOSPADM

## 2025-04-14 RX ORDER — ASPIRIN 81 MG/1
81 TABLET ORAL DAILY
Status: DISCONTINUED | OUTPATIENT
Start: 2025-04-15 | End: 2025-04-16 | Stop reason: HOSPADM

## 2025-04-14 RX ORDER — FENTANYL CITRATE 50 UG/ML
50 INJECTION, SOLUTION INTRAMUSCULAR; INTRAVENOUS
Status: CANCELLED | OUTPATIENT
Start: 2025-04-14

## 2025-04-14 RX ORDER — SODIUM CHLORIDE 9 MG/ML
INJECTION, SOLUTION INTRAVENOUS CONTINUOUS PRN
Status: DISCONTINUED | OUTPATIENT
Start: 2025-04-14 | End: 2025-04-14 | Stop reason: SURG

## 2025-04-14 RX ORDER — ONDANSETRON 4 MG/1
4 TABLET, ORALLY DISINTEGRATING ORAL EVERY 6 HOURS PRN
Status: DISCONTINUED | OUTPATIENT
Start: 2025-04-14 | End: 2025-04-16 | Stop reason: HOSPADM

## 2025-04-14 RX ORDER — ONDANSETRON 2 MG/ML
4 INJECTION INTRAMUSCULAR; INTRAVENOUS ONCE AS NEEDED
Status: DISCONTINUED | OUTPATIENT
Start: 2025-04-14 | End: 2025-04-14 | Stop reason: SDUPTHER

## 2025-04-14 RX ORDER — IPRATROPIUM BROMIDE AND ALBUTEROL SULFATE 2.5; .5 MG/3ML; MG/3ML
3 SOLUTION RESPIRATORY (INHALATION) ONCE AS NEEDED
Status: DISCONTINUED | OUTPATIENT
Start: 2025-04-14 | End: 2025-04-15 | Stop reason: HOSPADM

## 2025-04-14 RX ORDER — SODIUM CHLORIDE 0.9 % (FLUSH) 0.9 %
10 SYRINGE (ML) INJECTION AS NEEDED
Status: DISCONTINUED | OUTPATIENT
Start: 2025-04-14 | End: 2025-04-14

## 2025-04-14 RX ORDER — PROTAMINE SULFATE 10 MG/ML
INJECTION, SOLUTION INTRAVENOUS AS NEEDED
Status: DISCONTINUED | OUTPATIENT
Start: 2025-04-14 | End: 2025-04-14 | Stop reason: SURG

## 2025-04-14 RX ORDER — ONDANSETRON 2 MG/ML
4 INJECTION INTRAMUSCULAR; INTRAVENOUS EVERY 6 HOURS PRN
Status: DISCONTINUED | OUTPATIENT
Start: 2025-04-14 | End: 2025-04-16 | Stop reason: HOSPADM

## 2025-04-14 RX ORDER — SODIUM CHLORIDE 0.9 % (FLUSH) 0.9 %
3 SYRINGE (ML) INJECTION EVERY 12 HOURS SCHEDULED
Status: CANCELLED | OUTPATIENT
Start: 2025-04-14

## 2025-04-14 RX ORDER — LIDOCAINE HYDROCHLORIDE 10 MG/ML
0.5 INJECTION, SOLUTION EPIDURAL; INFILTRATION; INTRACAUDAL; PERINEURAL ONCE AS NEEDED
Status: COMPLETED | OUTPATIENT
Start: 2025-04-14 | End: 2025-04-14

## 2025-04-14 RX ORDER — NALOXONE HCL 0.4 MG/ML
0.4 VIAL (ML) INJECTION AS NEEDED
Status: CANCELLED | OUTPATIENT
Start: 2025-04-14

## 2025-04-14 RX ORDER — SODIUM CHLORIDE 0.9 % (FLUSH) 0.9 %
10 SYRINGE (ML) INJECTION EVERY 12 HOURS SCHEDULED
Status: DISCONTINUED | OUTPATIENT
Start: 2025-04-14 | End: 2025-04-14

## 2025-04-14 RX ORDER — SODIUM CHLORIDE 0.9 % (FLUSH) 0.9 %
3-10 SYRINGE (ML) INJECTION AS NEEDED
Status: CANCELLED | OUTPATIENT
Start: 2025-04-14

## 2025-04-14 RX ORDER — SODIUM CHLORIDE 9 MG/ML
250 INJECTION, SOLUTION INTRAVENOUS ONCE AS NEEDED
Status: ACTIVE | OUTPATIENT
Start: 2025-04-14 | End: 2025-04-15

## 2025-04-14 RX ORDER — FAMOTIDINE 20 MG/1
20 TABLET, FILM COATED ORAL ONCE
Status: COMPLETED | OUTPATIENT
Start: 2025-04-14 | End: 2025-04-14

## 2025-04-14 RX ORDER — AMOXICILLIN 250 MG
2 CAPSULE ORAL NIGHTLY
Status: DISCONTINUED | OUTPATIENT
Start: 2025-04-14 | End: 2025-04-16 | Stop reason: HOSPADM

## 2025-04-14 RX ORDER — ONDANSETRON 2 MG/ML
INJECTION INTRAMUSCULAR; INTRAVENOUS AS NEEDED
Status: DISCONTINUED | OUTPATIENT
Start: 2025-04-14 | End: 2025-04-14 | Stop reason: SURG

## 2025-04-14 RX ORDER — OXYCODONE AND ACETAMINOPHEN 7.5; 325 MG/1; MG/1
1 TABLET ORAL EVERY 4 HOURS PRN
Status: ACTIVE | OUTPATIENT
Start: 2025-04-14 | End: 2025-04-15

## 2025-04-14 RX ORDER — PROMETHAZINE HYDROCHLORIDE 25 MG/1
25 TABLET ORAL ONCE AS NEEDED
Status: CANCELLED | OUTPATIENT
Start: 2025-04-14

## 2025-04-14 RX ORDER — SODIUM CHLORIDE 9 MG/ML
9 INJECTION, SOLUTION INTRAVENOUS AS NEEDED
Status: CANCELLED | OUTPATIENT
Start: 2025-04-14 | End: 2025-04-15

## 2025-04-14 RX ADMIN — ONDANSETRON 4 MG: 2 INJECTION INTRAMUSCULAR; INTRAVENOUS at 11:32

## 2025-04-14 RX ADMIN — ETOMIDATE INJECTION 14 MG: 2 SOLUTION INTRAVENOUS at 10:50

## 2025-04-14 RX ADMIN — PROTAMINE SULFATE 100 MG: 10 INJECTION, SOLUTION INTRAVENOUS at 11:36

## 2025-04-14 RX ADMIN — NICARDIPINE HYDROCHLORIDE 5 MG/HR: 25 INJECTION INTRAVENOUS at 11:29

## 2025-04-14 RX ADMIN — ESMOLOL HYDROCHLORIDE 70 MG: 100 INJECTION, SOLUTION INTRAVENOUS at 10:50

## 2025-04-14 RX ADMIN — MUPIROCIN 1 APPLICATION: 20 OINTMENT TOPICAL at 08:49

## 2025-04-14 RX ADMIN — LIDOCAINE HYDROCHLORIDE 0.5 ML: 10 INJECTION, SOLUTION EPIDURAL; INFILTRATION; INTRACAUDAL; PERINEURAL at 08:45

## 2025-04-14 RX ADMIN — DONEPEZIL HYDROCHLORIDE 10 MG: 10 TABLET, FILM COATED ORAL at 21:18

## 2025-04-14 RX ADMIN — SENNOSIDES, DOCUSATE SODIUM 2 TABLET: 50; 8.6 TABLET, FILM COATED ORAL at 21:23

## 2025-04-14 RX ADMIN — HEPARIN SODIUM 10000 UNITS: 1000 INJECTION INTRAVENOUS; SUBCUTANEOUS at 11:16

## 2025-04-14 RX ADMIN — CHLORHEXIDINE GLUCONATE 15 ML: 1.2 SOLUTION ORAL at 08:38

## 2025-04-14 RX ADMIN — BRINZOLAMIDE 1 DROP: 10 SUSPENSION/ DROPS OPHTHALMIC at 21:42

## 2025-04-14 RX ADMIN — SUGAMMADEX 200 MG: 100 INJECTION, SOLUTION INTRAVENOUS at 11:36

## 2025-04-14 RX ADMIN — SODIUM CHLORIDE: 9 INJECTION, SOLUTION INTRAVENOUS at 10:45

## 2025-04-14 RX ADMIN — ACETAMINOPHEN 650 MG: 325 TABLET, FILM COATED ORAL at 18:16

## 2025-04-14 RX ADMIN — MIRTAZAPINE 45 MG: 15 TABLET, FILM COATED ORAL at 21:17

## 2025-04-14 RX ADMIN — ROCURONIUM BROMIDE 50 MG: 10 INJECTION INTRAVENOUS at 10:50

## 2025-04-14 RX ADMIN — PHENYLEPHRINE HYDROCHLORIDE 0.5 MCG/KG/MIN: 10 INJECTION INTRAVENOUS at 11:12

## 2025-04-14 RX ADMIN — DEXAMETHASONE SODIUM PHOSPHATE 4 MG: 4 INJECTION INTRA-ARTICULAR; INTRALESIONAL; INTRAMUSCULAR; INTRAVENOUS; SOFT TISSUE at 11:12

## 2025-04-14 RX ADMIN — SODIUM CHLORIDE 9 ML/HR: 0.9 INJECTION, SOLUTION INTRAVENOUS at 08:43

## 2025-04-14 RX ADMIN — SODIUM CHLORIDE 750 MG: 9 INJECTION, SOLUTION INTRAVENOUS at 08:23

## 2025-04-14 RX ADMIN — FAMOTIDINE 20 MG: 20 TABLET, FILM COATED ORAL at 08:38

## 2025-04-14 RX ADMIN — BRIMONIDINE TARTRATE AND TIMOLOL MALEATE 1 DROP: 2; 5 SOLUTION OPHTHALMIC at 21:42

## 2025-04-14 NOTE — CONSULTS
[facility] at [facility]  ELECTROPHYSIOLOGY CARDIOLOGY CONSULTATION NOTE    Anali Hensley  [id number]  1935   LOS: 0 days   Patient Care Team:  Sagar Mesa MD as PCP - General (Family Medicine)  Hudson Marley MD as Consulting Physician (Cardiology)  Em Hoyt APRN as Nurse Practitioner (Cardiology)  Niall Benjamin MD as Surgeon (Cardiothoracic Surgery)    Reason for Consultation: CHB s/p TAVR    Problem List:  CHB s/p TAVR  Heart murmur  Echo, 01/13/2025: EF 70%. Mild concentric hypertrophy. Severe aortic stenosis. Aortic pressure gradient: max (92 mmHg) mean (53 mmHg). Systolic anterior motion of anterior mitral leaflet. Moderate to severe MR. Moderate TR with RVSP of 35-45 mmHg.   Bronchiectasis   Aortic stenosis  GABE, 02/11/2025: EF 60%. Mild concentric hypertrophy. Severe aortic stenosis. Aortic valve area is 1 cm^2. Aortic valve annulus is 2.3 cm. Moderate MR. Moderate to severe MV stenosis with mean graident of 11-12 mmHg. Trace to mild TR with normal RVSP.   LHC, 02/11/2025: 50% LAD, 30% RCA.   Carotid duplex, 02/13/2025: CAMI/LICA <50% stenosis.   TAVR, 4/14/25 with Dr. Macdonald and Dr. Benjamin  Anxiety/depression  Diabetes  Hypertension  Surgeries:  Hysterectomy          History of Present Illness:      Anali Hensley is a 89 y.o. female with a past medical history listed above presented to [facility] today for TAVR with Dr. Macdonald and Dr. Benjamin.  Patient went into complete heart block and we have been asked to consult.  GABE shows EF greater than 55%. Patient responsive, but sleepy. Family at bedside. Patient is in SR with PVCs.     Cardiac risk factors: advanced age (older than 55 for men, 65 for women), diabetes mellitus, and hypertension.    Allergies   Allergen Reactions    Penicillins Swelling    Stevia [Stevioside] Swelling     PACK       Past Medical History:   Diagnosis Date    Aortic valve stenosis, severe      "Arthritis     Bronchiectasis     GERD (gastroesophageal reflux disease)     H/O chest x-ray 02/16/2016    no acute infiltrates or effusions, no change compared to her prior film from 2014. In comparison to our film from the office in 2014 which was compared to 2010; taking into account differences in technique the really does not look like there is much of a change in the prominent interstitial markings that are present. No effusions as above. No consolidations.     H/O chest x-ray 10/28/2010    Cariac silhouute is 12/24 and at upper limits of normal size. Several calcified nodules consistent with old granulomatous disease. A prominent \"dirty\" interstitial pattern but no consolidation of air bronchograms, In comparison to our prior films from 10/14/09 there is probably no significant interval change    H/O chest x-ray 10/14/2009    Good qaulity. CT ratio is 11/25 cm. Increased interstitial markings on the bases. Old granulomatou disease is present. No pleural effusions. No pneumothorax. And overall, no significant changes compared to prior films from 9/25/08    H/O CT scan of chest     showing a patchy infiltrate in the right middle lobe which resembled a masslike consolidation.Improved after abx, with repeat CT in [place] neg/ followed in masslike consolidation.Improved after abx, with repeat CT in [place] neg/ followed inmasslike consolidation.Improved after abx, with repeat CT in [place] neg/ followed [id number] by her PCP; she was to be referred back for bronch if persis.    History of bronchoscopy     Reported negative bronchoscopy in 1993, then referred to Dr. Asher 2005 who  repeated a CAT scan due to R sided infiltrate with bronchiectasis exacerbation.    History of diabetes mellitus     History of PFTs 02/16/2016    Pulmonary function tests reveal no obstruction, restrictive pattern as before. FEV1 similar to prior at 1.26, 66%. Minute ventilation reduced.    History of PFTs 06/09/2015    no obstruction " based on normal FEV/FVC ration though FEC dec sl from last. FVC reduced sugg a restrictive pattern    History of PFTs 10/22/2013    Ration normal. No obstruction. no restriction. MVV normal. no response BD rX    History of transfusion 1967    reaction - patient was given the wrong blood product type    Hx of chest x-ray 09/25/2008    Ct ratio is 12.5/24.5 Costophrenic angles are clear. Increased interstitial markings over both bases. No acute changes. Old granulomatous disease present    Hypertension     Nonrheumatic mitral (valve) stenosis     Short-term memory loss     Vaginal wall prolapse     H/o      Past Surgical History:   Procedure Laterality Date    BLADDER REPAIR      CARDIAC CATHETERIZATION N/A 02/11/2025    Procedure: Left Heart Cath;  Surgeon: Hudson Marley MD;  Location: [facility] CATH INVASIVE LOCATION;  Service: Cardiovascular;  Laterality: N/A;    HYSTERECTOMY        Social History     Socioeconomic History    Marital status:    Tobacco Use    Smoking status: Never     Passive exposure: Past    Smokeless tobacco: Never   Vaping Use    Vaping status: Never Used   Substance and Sexual Activity    Alcohol use: No    Drug use: No    Sexual activity: Defer     Family History   Problem Relation Age of Onset    Hypertension Mother     Osteoporosis Mother     Heart disease Father     No Known Problems Brother        Medications Prior to Admission   Medication Sig Dispense Refill Last Dose/Taking    ascorbic acid with antonio hips 1000 MG tablet Take 1 tablet by mouth Daily.   4/13/2025    aspirin 81 MG EC tablet Take 1 tablet by mouth Daily.   4/13/2025 at  9:30 PM    AZOPT 1 % ophthalmic suspension Administer 1 drop to both eyes 2 (Two) Times a Day.   4/14/2025 at  7:00 AM    bimatoprost (LUMIGAN) 0.01 % ophthalmic drops Administer 1 drop to both eyes Every Night.   4/13/2025    brimonidine-timolol (Combigan) 0.2-0.5 % ophthalmic solution Administer 1 drop to both eyes Every 12 (Twelve) Hours.    4/14/2025 Morning    docusate sodium (Colace) 100 MG capsule Take 1 capsule by mouth Daily.   4/14/2025 Morning    donepezil (ARICEPT) 10 MG tablet Take 1 tablet by mouth Daily.   4/13/2025 at  7:30 PM    LORazepam (ATIVAN) 1 MG tablet Take  by mouth. (Patient taking differently: Take 0.5 tablets by mouth Every Night.)   4/13/2025 at  7:30 PM    mirtazapine (REMERON) 45 MG tablet Take 1 tablet by mouth Every Night.   4/13/2025 at  7:30 PM    Polyethylene Glycol 3350 (MIRALAX PO) Take 0.5 Capfuls by mouth Daily.   4/13/2025 at 10:00 AM    spironolactone (ALDACTONE) 25 MG tablet Take 1 tablet by mouth Daily.   4/13/2025 at 10:00 AM    zinc gluconate 50 MG tablet Take 0.5 tablets by mouth Daily.   4/13/2025 at  7:30 PM    acetaminophen (Tylenol 8 Hour) 650 MG 8 hr tablet Take 1 tablet by mouth Every 8 (Eight) Hours.   More than a month    Diclofenac Sodium (VOLTAREN) 1 % gel gel APPLY 2 GRAMS TO THE AFFECTED AREA(S) BY TOPICAL ROUTE 4 TIMES PER DAY (Patient taking differently: Apply 4 g topically to the appropriate area as directed As Needed (PRN).)   More than a month    naproxen sodium (Aleve) 220 MG tablet Every 12 (Twelve) Hours. (Patient taking differently: Take 1 tablet by mouth As Needed for Headache.)   More than a month    promethazine (PHENERGAN) 12.5 MG tablet TAKE 1 OR 2 TABLETS EVERY 8 HOURS (Patient taking differently: Take 1 tablet by mouth As Needed for Nausea or Vomiting.)   More than a month     Scheduled Meds:[START ON 4/15/2025] aspirin, 81 mg, Oral, Daily  senna-docusate sodium, 2 tablet, Oral, Nightly      Continuous Infusions:niCARdipine, 5-15 mg/hr  niCARdipine, 5-15 mg/hr  phenylephrine, 0.5-3 mcg/kg/min  sodium chloride, 9 mL/hr, Last Rate: 9 mL/hr (04/14/25 9143)  sodium chloride, 100 mL/hr, Last Rate: 100 mL/hr (04/14/25 1214)      PRN Meds:.  atropine    Calcium Replacement - Follow Nurse / BPA Driven Protocol    hydrALAZINE    ipratropium-albuterol    labetalol    Magnesium Cardiology Dose  Replacement - Follow Nurse / BPA Driven Protocol    magnesium hydroxide    niCARdipine    nitroglycerin    ondansetron ODT **OR** ondansetron    oxyCODONE-acetaminophen    Phosphorus Replacement - Follow Nurse / BPA Driven Protocol    Potassium Replacement - Follow Nurse / BPA Driven Protocol    sodium chloride    Review of Systems  All systems have been reviewed and are negative with the exception of those mentioned in the HPI and problem list above.     Objective:       Physical Exam  /71 (BP Location: Right arm, Patient Position: Lying)   Pulse 65   Temp 93.2 °F (34 °C)   Resp 16   SpO2 93%   There were no vitals filed for this visit.  There is no height or weight on file to calculate BMI.    Intake/Output Summary (Last 24 hours) at 4/14/2025 1233  Last data filed at 4/14/2025 1132  Gross per 24 hour   Intake 800 ml   Output --   Net 800 ml       Physical Exam  General Appearance:  Alert, cooperative, no distress, appears stated age   Neck: Supple, symmetrical, trachea midline, no carotid bruit or JVD   Lungs:   Clear to auscultation bilaterally, respirations unlabored   Heart:  Regular rate and rhythm, S1, S2 normal, no murmur, rub or gallop   Extremities: No edema, normal range of motion   Pulses: 2+ and symmetric   Skin: Skin color, texture, turgor normal, no rashes or lesions   Neurologic: Normal     Cardiographics  EKG: CHB / SR with PVCs  ECHO: Results for orders placed during the hospital encounter of 02/11/25    Adult Transesophageal Echo (GABE) W/ Cont if Necessary Per Protocol    Interpretation Summary    Left ventricular systolic function is normal. Estimated left ventricular EF = 60%    Left ventricular wall thickness is consistent with mild concentric hypertrophy.    Severe aortic valve stenosis is present. Aortic valve area is 1 cm2.  Valve mobility is limited.  Consider BAV    Peak velocity of the flow distal to the aortic valve is 466 cm/s. Aortic valve mean pressure gradient is 57  "mmHg.    The aortic valve annulus is 2.3 cm.    Moderate mitral valve regurgitation is present.    Moderate to severe mitral valve stenosis is present. The mitral valve mean gradient is 11 - 12 mmHg.    Trace to mild tricuspid regurgitation.    Estimated right ventricular systolic pressure from tricuspid regurgitation is normal (<35 mmHg).    Anesthesia: Cath lab/Echo lab moderate sedation    I was present with the patient for the duration of moderate sedation and supervised staff who had no other duties and monitored the patient for the entire procedure.    Name of independent trained observer: Destinee Vela RN  Intra-service start time: 1105  Intra-service end time: 1134       Imaging  Chest x-ray: No radiology results for the last day     Lab Review   Results from last 7 days   Lab Units 04/11/25  1340   SODIUM mmol/L 134*   POTASSIUM mmol/L 4.5   CHLORIDE mmol/L 98   CO2 mmol/L 26.0   BUN mg/dL 22   CREATININE mg/dL 0.68   GLUCOSE mg/dL 178*   CALCIUM mg/dL 9.6     Results from last 7 days   Lab Units 04/14/25  1212 04/11/25  1340   WBC 10*3/mm3 4.75 6.19   HEMOGLOBIN g/dL 11.2* 12.1   HEMATOCRIT % 34.8 37.9   PLATELETS 10*3/mm3 174 236         Results from last 7 days   Lab Units 04/11/25  1340   HEMOGLOBIN A1C % 5.90*              Aortic stenosis, severe    Depression    Hypertension    GERD (gastroesophageal reflux disease)    Bronchiectasis    Abnormal chest CT (Bronchiectasis, partila RML collapse, \"Tree-in-bud\" opacities)    Severe aortic stenosis    Severe aortic valve stenosis        Assessment:     CHB s/p TAVR  Prior EKG shows first degree and RBBB.  Currently SR with PVCs   Aortic stenosis  S/p TAVR today with Dr. Macdonald and Dr. Benjamin.      Plan:     Patient currently SR in 60's with PVCs. Ectopy improving.   Discussed that patient would most likely need a pacemaker prior to discharge with family. Will monitor over night and continue discussion when patient is more awake.   NPO after " midnight.       Electronically signed by LC Klein, 04/14/25, 12:33 PM EDT.     Please note that portions of this note were dictated utilizing Dragon dictation.

## 2025-04-14 NOTE — ANESTHESIA PREPROCEDURE EVALUATION
Anesthesia Evaluation     Patient summary reviewed and Nursing notes reviewed   no history of anesthetic complications:   NPO Solid Status: > 8 hours  NPO Liquid Status: > 2 hours           Airway   Mallampati: II  TM distance: >3 FB  Neck ROM: full  No difficulty expected  Dental - normal exam     Pulmonary    (-) not a smoker  Cardiovascular     ECG reviewed    (+) hypertension, valvular problems/murmurs AS and MS, murmur    ROS comment: ECHO:  Interpretation Summary  ·  Left ventricular systolic function is normal. Estimated left ventricular EF = 60%  ·  Left ventricular wall thickness is consistent with mild concentric hypertrophy.  ·  Severe aortic valve stenosis is present. Aortic valve area is 1 cm2.  Valve mobility is limited.  Consider BAV  ·  Peak velocity of the flow distal to the aortic valve is 466 cm/s. Aortic valve mean pressure gradient is 57 mmHg.  ·  The aortic valve annulus is 2.3 cm.  ·  Moderate mitral valve regurgitation is present.  ·  Moderate to severe mitral valve stenosis is present. The mitral valve mean gradient is 11 - 12 mmHg.  ·  Trace to mild tricuspid regurgitation.  ·  Estimated right ventricular systolic pressure from tricuspid regurgitation is normal (<35 mmHg).      Neuro/Psych  GI/Hepatic/Renal/Endo    (+) GERD, diabetes mellitus    Musculoskeletal     Abdominal    Substance History      OB/GYN          Other   arthritis,                   Anesthesia Plan    ASA 4     general and [place]     intravenous induction     Anesthetic plan, risks, benefits, and alternatives have been provided, discussed and informed consent has been obtained with: patient.  Pre-procedure education provided  Use of blood products discussed with patient  Consented to blood products.    Plan discussed with CRNA.      CODE STATUS:

## 2025-04-14 NOTE — OP NOTE
DATE OF PROCEDURE: 4/14/2025      PREOPERATIVE DIAGNOSES:  1. Severe symptomatic aortic valve stenosis      POSTOPERATIVE DIAGNOSES:    1. Severe symptomatic aortic valve stenosis      PROCEDURE:    1. Percutaneous left common femoral arterial sheath placement  2. Aortogram  3. Balloon aortic valvuloplasty  4. Transcatheter aortic valve replacement (20 mm Mirna 3 Ultra tissue valve)  5. Completion aortogram      SURGEON: Niall Benjamin MD        Assistant:  Richelle Vargas PA-C was responsible for performing the following activities: Retraction and Placing Dressing and their skilled assistance was necessary for the success of this case.     CARDIOLOGISTS:  1. Wilbur Del Valle MD  2. Shyanne Macdonald MD    ANESTHESIA: General endotracheal anesthesia with Dr Alexander Burks MD      ESTIMATED BLOOD LOSS: Less than 25 mL      FLUOROSCOPY TIME: 8:12 with an exposure of 111 mGy      CONTRAST: 45 mL       INDICATIONS: 89-year-old  female with a history of chronic bronchiectasis, anxiety, depression and mild dementia who presented with constipation.  She was found to have severe aortic valve stenosis.  We discussed options including surgical aortic valve replacement, transcatheter aortic valve replacement and medical management.  After a shared decision making discussion, the patient wished to proceed with transcatheter aortic valve replacement.  The risks and benefits of surgery were discussed with the patient including pain, bleeding, infection, renal failure, stroke, heart block requiring a pacemaker, myocardial infarction and death. The patient understood these risks and wished to proceed with surgery.       DESCRIPTION OF PROCEDURE: The patient was taken to the operating room and placed under general endotracheal anesthesia.  She was prepped and draped in the usual sterile fashion. A timeout was performed including the patient’s name, procedure, and antibiotic administration.  Right common femoral arterial  and venous lines were placed by the cardiologists for placement of a pigtail catheter and temporary venous pacer. Needle access of the left common femoral artery over the femoral head was obtained and the incision was enlarged using a #11 blade. Systemic heparin was administered. A 7-Austrian dilator was then placed over the wire using modified Seldinger technique and 2 Perclose devices were deployed. A 7-Austrian sheath was then placed followed by a 14-Austrian sheath within the abdominal aorta. This was secured using a silk suture. An AL-1 catheter was utilized to cross the aortic valve with placement of a Safari wire in the left ventricle. The balloon was then placed over the wire into the aortic valve and rapid pacing was performed with satisfactory balloon valvuloplasty. The balloon was then exchanged for the 20 mm CATHERINE 3 valve, which was placed in the correct position at the aortic annulus. The valve was deployed after rapid pacing and was found to be in satisfactory position with no paravalvular leak on echocardiogram. Aortogram was performed that revealed paravalvular aortic regurgitation. The sheath was removed and wire left in place.  The two Perclose devices were deployed with satisfactory hemostasis.  An aortogram was then performed that revealed intact vasculature.  The groin incision was then sealed with skin glue and the right groin pigtail catheter was removed.  The pacing wire was left in place given her postprocedure complete heart block.  The sheaths were left in place and the patient was extubated in the operating room and transported to the ICU in stable condition.

## 2025-04-14 NOTE — PROGRESS NOTES
Intensive Care Follow-up     Hospital:  LOS: 0 days   Ms. Anali Hensley, 89 y.o. female is followed for:   Aortic stenosis, severe            History of present illness:   89 year-old female with aortic stenosis, bronchiectasis, anxiety, depression, and HTN that presents to [facility] ICU postoperatively from scheduled TAVR today with Dr. Benjamin.     Patient has been experiencing symptoms of lower extremity swelling and mild worsening of fatigue. With the above symptoms and severe aortic stenosis, patient referred for TAVR evaluation and underwent work-up. GABE showed severe AS. LHC with non-flow limiting CAD. Carotid duplex with 50% stenosis bilaterally. TAVR CTA noted widely patent vasculature, bilateral bronchiectasis and near complete middle lobe atelectasis and collapse suggestive of acute on chronic atypical infection such as STEVEN. PFTs not available.    Due to the above lung findings on imaging, she was referred to Dr. Morrissey in Pulmonary Office for evaluation. Her symptoms reported were baseline, thus, he felt nothing to add from a pulmonary standpoint.      Time spent: 3 minutes  Electronically signed by LC Fajardo, 04/14/25, 9:27 AM EDT.      Subjective   Interval History:  Patient underwent transcatheter aortic valve replacement.  Course was complicated by complete heart block requiring temporary transvenous pacemaker.  On recheck patient now has first-degree block and has intrinsic rhythm.  Will continue support as needed.  Patient otherwise is very lethargic.  Very restless.  Not following commands at this time.  Hemodynamically stable otherwise.                 The patient's past medical, surgical and social history were reviewed and updated in Epic as appropriate.       Objective     Infusions:  niCARdipine, 5-15 mg/hr  niCARdipine, 5-15 mg/hr  phenylephrine, 0.5-3 mcg/kg/min  sodium chloride, 9 mL/hr, Last Rate: 9 mL/hr (04/14/25 0843)  sodium chloride, 100 mL/hr, Last Rate: 100 mL/hr  "(04/14/25 1214)      Medications:  [START ON 4/15/2025] aspirin, 81 mg, Oral, Daily  senna-docusate sodium, 2 tablet, Oral, Nightly        Vital Sign Min/Max for last 24 hours  Temp  Min: 93.2 °F (34 °C)  Max: 97.5 °F (36.4 °C)   BP  Min: 141/71  Max: 141/71   Pulse  Min: 58  Max: 70   Resp  Min: 16  Max: 16   SpO2  Min: 93 %  Max: 98 %   Flow (L/min) (Oxygen Therapy)  Min: 2  Max: 2       Input/Output for last 24 hour shift  No intake/output data recorded.   S RR:  [5-12] 5  Objective:  Vital signs: (most recent): Blood pressure 141/71, pulse 65, temperature 93.2 °F (34 °C), resp. rate 16, SpO2 93%.            General Appearance: Lethargic, restless  Head:  Pupils reactive & symmetrical B/L.  Lungs:   B/L Breath sounds present with decreased breath sounds on bases, no wheezing heard, no crackles.   Heart: S1 and S2 present, no murmur, paced rhythm  Abdomen: Soft, nontender, no guarding or rigidity, bowel sounds positive.  Extremities:  no edema, warm to touch.Arterial and venous sheaths in place.   Pulses: Positive and symmetric.  Neurologic:  Moving all four extremities. Not able to participate in full neurological exam      Results from last 7 days   Lab Units 04/14/25  1212 04/11/25  1340   WBC 10*3/mm3 4.75 6.19   HEMOGLOBIN g/dL 11.2* 12.1   PLATELETS 10*3/mm3 174 236     Results from last 7 days   Lab Units 04/14/25  1212 04/11/25  1340   SODIUM mmol/L 133* 134*   POTASSIUM mmol/L 4.0 4.5   CO2 mmol/L 24.0 26.0   BUN mg/dL 19 22   CREATININE mg/dL 0.57 0.68   MAGNESIUM mg/dL  --  1.9   GLUCOSE mg/dL 121* 178*     Estimated Creatinine Clearance: 59.1 mL/min (by C-G formula based on SCr of 0.57 mg/dL).          Images:   None new    I reviewed the patient's results and images.     Assessment & Plan   Impression        Aortic stenosis, severe    Depression    Hypertension    GERD (gastroesophageal reflux disease)    Bronchiectasis    Abnormal chest CT (Bronchiectasis, partila RML collapse, \"Tree-in-bud\" " opacities)    Severe aortic stenosis    Severe aortic valve stenosis       Plan        1.  Patient is post TAVR.  CT surgery and cardiology team following closely.  Course is complicated by complete heart block requiring temporary transvenous pacemaker placement.  EP team has been consulted.  Will await their recommendations.  2.  Neurologically patient is still not fully awake.  Will let anesthesia wear off and monitor closely.  Patient seems to be moving all 4 extremities.  Has good  strength but not following commands at this time.  Will monitor and if no improvement will consider further workup.  3.  Labs checked.  Mild hyponatremia and will monitor for now.  Other electrolytes stable.  4.  Monitor urine output and electrolytes closely.  5.  Perioperative antibiotics given.  6.  Looks like patient is on multiple medications including donepezil.   will need medication reconciliation once patient is more awake and then will resume home medications as needed.    Will closely monitor in ICU.  Discussed with nursing staff.    Plan of care and goals reviewed with multidisciplinary/antibiotic stewardship team during rounds.   I discussed the patient's findings and my recommendations with nursing staff     Johnson Burden MD, [facility]  Pulmonary, Critical care and Sleep Medicine

## 2025-04-14 NOTE — ANESTHESIA PROCEDURE NOTES
Intra-Op Anesthesia GABE    Procedure Performed: Intra-Op Anesthesia GABE       Start Time:  4/14/2025 10:55 AM       End Time:   4/14/2025 11:34 AM    Preanesthesia Checklist:  Patient identified, IV assessed, risks and benefits discussed, monitors and equipment assessed, procedure being performed at surgeon's request and anesthesia consent obtained.    General Procedure Information  Diagnostic Indications for Echo:  assessment of surgical repair  Physician Requesting Echo: Niall Benjamin MD  Location performed:  OR  Intubated  Bite block not placed  Heart visualized  Probe Insertion:  Easy  Probe Type:  Multiplane  Modalities:  2D only, color flow mapping, continuous wave Doppler and pulse wave Doppler    Echocardiographic and Doppler Measurements    Ventricles    Right Ventricle:  Cavity size dilated.  Hypertrophy not present.  Thrombus not present.  Global function normal.    Left Ventricle:  Cavity size normal.  Thrombus not present.  Global Function normal.      Ventricular Regional Function:  1- Basal Anteroseptal:  normal  2- Basal Anterior:  normal  3- Basal Anterolateral:  normal  4- Basal Inferolateral:  normal  5- Basal Inferior:  normal  6- Basal Inferoseptal:  normal  7- Mid Anteroseptal:  normal  8- Mid Anterior:  normal  9- Mid Anterolateral:  normal  10- Mid Inferolateral:  normal  11- Mid Inferior:  normal  12- Mid Inferoseptal:  normal  13- Apical Anterior:  normal  14- Apical Lateral:  normal  15- Apical Inferior:  normal  16- Apical Septal:  normal  17- Amenia:  normal      Valves    Aortic Valve:  Annulus calcified.  Stenosis severe.  Area: 0.5 cm².  Mean Gradient: 43 mmHg.  Regurgitation mild.  Leaflets calcified and thickened.  Leaflet motions restricted.      Mitral Valve:  Annulus calcified.  Stenosis moderate.  Area: 1 cm².  Mean Gradient: 5 mmHg.  Regurgitation mild.  Leaflets calcified and thickened.  Leaflet motions restricted.      Tricuspid Valve:  Annulus normal.  Stenosis not present.   Regurgitation trace.  Leaflets normal.  Leaflet motions normal.    Pulmonic Valve:  Annulus normal.  Stenosis not present.  Regurgitation trace.        Aorta    Ascending Aorta:  Size normal.  Dissection not present.  Plaque thickness less than 3 mm.  Mobile plaque not present.    Aortic Arch:  Size normal.  Dissection not present.  Plaque thickness greater than 3 mm.  Mobile plaque not present.    Descending Aorta:  Size normal.  Dissection not present.  Plaque thickness greater than 3 mm.  Mobile plaque not present.        Atria    Right Atrium:  Size normal.  Spontaneous echo contrast not present.  Thrombus not present.  Tumor not present.  Device not present.      Left Atrium:  Size normal.  Spontaneous echo contrast not present.  Thrombus not present.  Tumor not present.  Device not present.    Left atrial appendage normal.      Septa        Ventricular Septum:  Intra-ventricular septum morphology normal.          Other Findings  Pericardium:  normal  Pleural Effusion:  none  Pulmonary Venous Flow:  normal    Anesthesia Information  Performed Personally  Anesthesiologist:  Alexander Burks Jr., MD      Echocardiogram Comments:       Diagnostic intraoperative GABE performed for TAVR.  Surgeon - Dr. Benjamin    Baseline Exam:  - LV is normal in size and function without regional wall motion abnormalities. LVH present. LVEF is >55%  - RV is normal in size and function with preserved systolic function.  - The AV is trileaflet and heavily calcified with restriction to leaflet opening. There is resultant severe AS, with mean PG of 43mmHg and EDUARDA of 0.5cm^2. There is mild AI, two regurgitant jets.  - The MV leaflet and thickened and restricted. There is moderate MS, mean PG is 5mmHg and MVA is 1cm^2.   - No other significant valvular abnormalities.  - Severe atherosclerotic disease in the descending thoracic aorta and aortic arch.    Post-procedure Exam:  - Interval TAVR performed  - Biventricular function is stable. No  inotropic support.  - There is a bioprosthetic valve in the native aortic location. It appears well seated and leaflet exhibit adequate excursion. There are no paravalvular leaks. Mean PG is 7mmHg and EDUARDA by continuity equation is 1.54cm^2.  - No other new valvular abnormalities.  - No evidence of dissection in the visualized portions of the aorta.

## 2025-04-14 NOTE — Clinical Note
A 6 fr sheath was successfully inserted into the right femoral artery.
A 6 fr sheath was successfully inserted into the right femoral artery.
A 8 fr sheath was successfully inserted into the right femoral vein.
All Patches/Pads removed. Skin Intact.
Allergies reviewed.  H&P note has been confirmed for the patient. Procedural consent has been signed.  Staff has reviewed the patient's labs.
Begin Rapid Ventricular Pacing
Begin Rapid Ventricular Pacing
Bilateral wrist restraints applied for patient safety during procedure. 
Bilateral wrist restraints removed, skin intact.
Catheter inserted with wire simultaneously.
Delivery system inserted.
Dilator exchanged
Dilator inserted
Dilator inserted
Hemostasis started on the right femoral vein. Figure 8 suturing was used in achieving hemostasis. Closure device deployed in the vessel. Hemostasis achieved successfully.
Inserted under fluoro.
Leadless device connected. 
No in lab complications
Perclose was used in achieving hemostasis. Closure device deployed in the vessel. Hemostasis achieved successfully.
Post-Procedural Saturation was taken from the Left Hand. Sat result is 95%.
Prepped: groin. Prepped with: ChloraPrep. The patient was draped in a sterile fashion.
Rapid Ventricular Pacing stopped.
Rapid Ventricular Pacing stopped.
Removed intact
Replaced previous sheath in the right femoral vein.
Sheath and delivery system removed.
Temporary Pacer Secured.
Temporary Pacer inserted
Tether cut and retracted into delivery system.
The DP pulses are +1 bilaterally. The radial pulses are +2 bilaterally.
The DP pulses are +2 bilaterally. The radial pulses are +2 bilaterally.
The aortic root was injected and visualized.
Thresholds tested.
Tines visualized.
Valve delivery system inserted.
Valve delivery system removed.
Valve deployed successfully.
Valvuloplasty balloon advanced across the aortic valve
catheter advanced into LV.
catheter removed  over the wire.
catheter removed  over the wire.
delivery sheath inserted over the wire.
inserted over wire.
removed.
removed.
Yes

## 2025-04-14 NOTE — ANESTHESIA PROCEDURE NOTES
Airway  Reason: elective    Date/Time: 4/14/2025 10:52 AM  Airway not difficult    General Information and Staff    Patient location during procedure: OR  CRNA/CAA: Maged Grossman CRNA    Indications and Patient Condition  Indications for airway management: airway protection    Preoxygenated: yes  MILS not maintained throughout    Mask difficulty assessment: 1 - vent by mask    Final Airway Details    Final airway type: endotracheal airway      Successful airway: ETT  Cuffed: yes   Successful intubation technique: direct laryngoscopy  Endotracheal tube insertion site: oral  Blade: Yanira  Blade size: 3  ETT size (mm): 7.0  Cormack-Lehane Classification: grade IIb - view of arytenoids or posterior of glottis only  Placement verified by: chest auscultation and capnometry   Measured from: lips  ETT/EBT  to lips (cm): 20  Number of attempts at approach: 1  Assessment: lips, teeth, and gum same as pre-op and atraumatic intubation    Additional Comments  Negative epigastric sounds, Breath sound equal bilaterally with symmetric chest rise and fall

## 2025-04-14 NOTE — ANESTHESIA PROCEDURE NOTES
Arterial Line      Patient reassessed immediately prior to procedure    Patient location during procedure: pre-op  Start time: 4/14/2025 10:05 AM  Stop Time:4/14/2025 10:10 AM       Line placed for hemodynamic monitoring.  Performed By   Anesthesiologist: Alexander Burks Jr., MD   Preanesthetic Checklist  Completed: patient identified, IV checked, site marked, risks and benefits discussed, surgical consent, monitors and equipment checked, pre-op evaluation and timeout performed  Arterial Line Prep    Sterile Tech: cap, gloves and sterile barriers  Prep: ChloraPrep  Patient monitoring: blood pressure monitoring, continuous pulse oximetry and EKG  Arterial Line Procedure   Laterality:right  Location:  radial artery  Catheter size: 20 G   Guidance: ultrasound guided  PROCEDURE NOTE/ULTRASOUND INTERPRETATION.  Using ultrasound guidance the potential vascular sites for insertion of the catheter were visualized to determine the patency of the vessel to be used for vascular access.  After selecting the appropriate site for insertion, the needle was visualized under ultrasound being inserted into the radial artery, followed by ultrasound confirmation of wire and catheter placement. There were no abnormalities seen on ultrasound; an image was taken; and the patient tolerated the procedure with no complications.   Number of attempts: 1  Successful placement: yes Images: still images not obtained  Post Assessment   Dressing Type: line sutured, occlusive dressing applied, secured with tape and wrist guard applied.   Complications no  Circ/Move/Sens Assessment: normal and unchanged.   Patient Tolerance: patient tolerated the procedure well with no apparent complications

## 2025-04-14 NOTE — OP NOTE
PREPROCEDURE DIAGNOSIS: Severe symptomatic aortic stenosis    POSTPROCEDURE DIAGNOSIS: Severe symptomatic aortic stenosis    PROCEDURE(S):   6F femoral arterial sheath placement.  8 F venous sheath placement  Temporary transvenous pacemaker insertion  Catheter placement in the aortic root  Multiple intraprocedural aortograms  Balloon aortic valvulopasty  Transcatheter aortic valve replacement using a 20+1 mm CATHERINE 3 pericardial prosthesis    INDICATIONS:   1. Critical aortic stenosis.    INTERVENTIONAL CARDIOLOGISTS:  Shyanne Macdonald MD.    CARDIAC SURGEONS:   Niall Benjamin MD    DESCRIPTION OF PROCEDURE:   After informed consent, the patient was brought to the OR in a fasting condition. The patient was prepped and draped from level of chin to knees by standard surgical technique.  Right femoral arterial access was obtained by percutaneous anterior wall puncture technique and an 6-Kyr[place]z arterial sheath was placed.  Venous access was obtained in similar fashion and a 8-[place] venous sheath was placed.     A temporary transvenous pacing electrode was inserted via the left femoral venous access and advanced to the right ventricular apex and excellent pacing/sensing was noted. A 5F pigtail catheter was advanced from the femoral arterial access and this was advanced to the aortic root, and intraprocedural aortography was performed to confirm the implantation angle.    At this time, left femoral arterial access was obtained by Dr. Benjamin and an Larsen sheath was placed (see separate note). Using this access, AL1 catheter, and straight wire, the aortic valve was crossed. The catheter was advanced into the left ventricle and the wire was exchanged for a Safari wire.  Balloon aortic valvuloplasty was then performed using a 16 x 40 mm [place] Gold balloon subsequently, a 20+1 mm tissue Larsen CATHERINE 3 valve was advanced using standard delivery system. This was positioned under fluoroscopy. After the satisfactory  position was confirmed, the valve was expanded under rapid pacing protocol. Satisfactory position was noted. Post implant images revealed trivial aortic insufficiency which was paravalvular. No significant central regurgitation was noted.    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  The pacing wire was therefore left in place.     At this time, the delivery system was removed. The arterial sheath was removed and the arteriotomy was closed by the surgeons (see separate note). The patient tolerated the procedure well and without complications.  Estimated blood loss < 100 cc    FINAL IMPRESSION:   Successful transcatheter aortic valve replacement with 20+1 mm  Larsen CATHERINE 3 pericardial prosthesis.   Will likely need permanent pacemaker.    Shyanne Macdonald MD, FACC

## 2025-04-14 NOTE — H&P
Pre-Op H&P  Anali Hensley  [id number]  1935      Chief complaint: Dizziness      Subjective:  Patient is a 89 y.o.female presents for scheduled surgery by Dr. Benjamin. She anticipates a TRANSCATHETER AORTIC VALVE REPLACEMENT; TRANSESOPHAGEAL ECHOCARDIOGRAM WITH ANESTHESIA  today. She has fatigue with minimal activity and some dizziness with standing. GABE showed peak velocity of 466 cm/s, mean pressure gradient 57 mmHg and aortic valve area of 1cm2. Cardiac catheterization revealed non flow limiting CAD. Carotid duplex with <50% stenosis bilaterally.       Review of Systems:  Constitutional-- No fever, chills or sweats. + fatigue.  CV-- No chest pain, palpitation or syncope. +HTN  Resp-- No cough, hemoptysis. +SOB  Skin--No rashes or lesions      Allergies:   Allergies   Allergen Reactions    Penicillins Swelling    Stevia [Stevioside] Swelling     PACK         Home Meds:  Medications Prior to Admission   Medication Sig Dispense Refill Last Dose/Taking    ascorbic acid with antonio hips 1000 MG tablet Take 1 tablet by mouth Daily.   4/13/2025    aspirin 81 MG EC tablet Take 1 tablet by mouth Daily.   4/13/2025 at  9:30 PM    AZOPT 1 % ophthalmic suspension Administer 1 drop to both eyes 2 (Two) Times a Day.   4/14/2025 at  7:00 AM    bimatoprost (LUMIGAN) 0.01 % ophthalmic drops Administer 1 drop to both eyes Every Night.   4/13/2025    brimonidine-timolol (Combigan) 0.2-0.5 % ophthalmic solution Administer 1 drop to both eyes Every 12 (Twelve) Hours.   4/14/2025 Morning    docusate sodium (Colace) 100 MG capsule Take 1 capsule by mouth Daily.   4/14/2025 Morning    donepezil (ARICEPT) 10 MG tablet Take 1 tablet by mouth Daily.   4/13/2025 at  7:30 PM    LORazepam (ATIVAN) 1 MG tablet Take  by mouth. (Patient taking differently: Take 0.5 tablets by mouth Every Night.)   4/13/2025 at  7:30 PM    mirtazapine (REMERON) 45 MG tablet Take 1 tablet by mouth Every Night.   4/13/2025 at  7:30 PM    Polyethylene  "Glycol 3350 (MIRALAX PO) Take 0.5 Capfuls by mouth Daily.   4/13/2025 at 10:00 AM    spironolactone (ALDACTONE) 25 MG tablet Take 1 tablet by mouth Daily.   4/13/2025 at 10:00 AM    zinc gluconate 50 MG tablet Take 0.5 tablets by mouth Daily.   4/13/2025 at  7:30 PM    acetaminophen (Tylenol 8 Hour) 650 MG 8 hr tablet Take 1 tablet by mouth Every 8 (Eight) Hours.   More than a month    Diclofenac Sodium (VOLTAREN) 1 % gel gel APPLY 2 GRAMS TO THE AFFECTED AREA(S) BY TOPICAL ROUTE 4 TIMES PER DAY (Patient taking differently: Apply 4 g topically to the appropriate area as directed As Needed (PRN).)   More than a month    naproxen sodium (Aleve) 220 MG tablet Every 12 (Twelve) Hours. (Patient taking differently: Take 1 tablet by mouth As Needed for Headache.)   More than a month    promethazine (PHENERGAN) 12.5 MG tablet TAKE 1 OR 2 TABLETS EVERY 8 HOURS (Patient taking differently: Take 1 tablet by mouth As Needed for Nausea or Vomiting.)   More than a month         PMH:   Past Medical History:   Diagnosis Date    Aortic valve stenosis, severe     Arthritis     Bronchiectasis     GERD (gastroesophageal reflux disease)     H/O chest x-ray 02/16/2016    no acute infiltrates or effusions, no change compared to her prior film from 2014. In comparison to our film from the office in 2014 which was compared to 2010; taking into account differences in technique the really does not look like there is much of a change in the prominent interstitial markings that are present. No effusions as above. No consolidations.     H/O chest x-ray 10/28/2010    Cariac silhouute is 12/24 and at upper limits of normal size. Several calcified nodules consistent with old granulomatous disease. A prominent \"dirty\" interstitial pattern but no consolidation of air bronchograms, In comparison to our prior films from 10/14/09 there is probably no significant interval change    H/O chest x-ray 10/14/2009    Good qaulity. CT ratio is 11/25 cm. " Increased interstitial markings on the bases. Old granulomatou disease is present. No pleural effusions. No pneumothorax. And overall, no significant changes compared to prior films from 9/25/08    H/O CT scan of chest     showing a patchy infiltrate in the right middle lobe which resembled a masslike consolidation.Improved after abx, with repeat CT in [place] neg/ followed in masslike consolidation.Improved after abx, with repeat CT in [place] neg/ followed inmasslike consolidation.Improved after abx, with repeat CT in [place] neg/ followed [id number] by her PCP; she was to be referred back for bronch if persis.    History of bronchoscopy     Reported negative bronchoscopy in 1993, then referred to Dr. Asher 2005 who  repeated a CAT scan due to R sided infiltrate with bronchiectasis exacerbation.    History of diabetes mellitus     History of PFTs 02/16/2016    Pulmonary function tests reveal no obstruction, restrictive pattern as before. FEV1 similar to prior at 1.26, 66%. Minute ventilation reduced.    History of PFTs 06/09/2015    no obstruction based on normal FEV/FVC ration though FEC dec sl from last. FVC reduced sugg a restrictive pattern    History of PFTs 10/22/2013    Ration normal. No obstruction. no restriction. MVV normal. no response BD rX    History of transfusion 1967    reaction - patient was given the wrong blood product type    Hx of chest x-ray 09/25/2008    Ct ratio is 12.5/24.5 Costophrenic angles are clear. Increased interstitial markings over both bases. No acute changes. Old granulomatous disease present    Hypertension     Nonrheumatic mitral (valve) stenosis     Short-term memory loss     Vaginal wall prolapse     H/o     PSH:    Past Surgical History:   Procedure Laterality Date    BLADDER REPAIR      CARDIAC CATHETERIZATION N/A 02/11/2025    Procedure: Left Heart Cath;  Surgeon: Hudson Marley MD;  Location: [facility] CATH INVASIVE LOC[facility];  Service: Cardiovascular;  Laterality: N/A;     HYSTERECTOMY         Immunization History:  Influenza: UTD  Pneumococcal: UTD  Tetanus: UTD    Social History:   Tobacco:   Social History     Tobacco Use   Smoking Status Never    Passive exposure: Past   Smokeless Tobacco Never      Alcohol:     Social History     Substance and Sexual Activity   Alcohol Use No         Physical Exam:/71 (BP Location: Right arm, Patient Position: Lying)   Pulse 58   Temp 97.5 °F (36.4 °C) (Temporal)   Resp 16   SpO2 97%       General Appearance:    Alert, cooperative, no distress, appears stated age   Head:    Normocephalic, without obvious abnormality, atraumatic   Lungs:     Clear to auscultation bilaterally, respirations unlabored    Heart:   Regular rate and rhythm, S1 and S2 normal    Abdomen:    Soft without tenderness   Extremities:   Extremities normal, atraumatic, no cyanosis or edema   Skin:   Skin color, texture, turgor normal, no rashes or lesions   Neurologic:   Grossly intact     Results Review:     LABS:  Lab Results   Component Value Date    WBC 6.19 04/11/2025    HGB 12.1 04/11/2025    HCT 37.9 04/11/2025    MCV 92.9 04/11/2025     04/11/2025    NEUTROABS 4.21 04/11/2025    GLUCOSE 178 (H) 04/11/2025    BUN 22 04/11/2025    CREATININE 0.68 04/11/2025     (L) 04/11/2025    K 4.5 04/11/2025    CL 98 04/11/2025    CO2 26.0 04/11/2025    MG 1.9 04/11/2025    CALCIUM 9.6 04/11/2025    ALBUMIN 4.0 04/11/2025    AST 18 04/11/2025    ALT 10 04/11/2025    BILITOT 0.3 04/11/2025       RADIOLOGY:    Study Result    Narrative & Impression   CT ANGIO TAVR CHEST ABDOMEN PELVIS     Date of Exam: 2/13/2025 2:45 PM EST     Indication: severe aortic valve stenosis, TAVR evaluation.     Comparison: None available.     Technique: CTA of the chest, abdomen and pelvis was performed before and after the uneventful intravenous administration of 100 mL Isovue-370 . Reconstructed coronal and sagittal images were also obtained. In addition, a 3-D volume rendered  image was   created for interpretation. Automated exposure control and iterative reconstruction methods were used.        Findings:  Aortic root measures 2.7 cm in diameter. The ascending aorta is slightly aneurysmal at 4 cm in diameter. The proximal great vessels are patent. The transverse aorta is normal in caliber at 2.7 cm. The descending thoracic aorta is normal in caliber at 2.2   cm. The celiac and SMA are widely patent. Accessory left renal artery. The renal arteries are widely patent. The AVELINA is widely patent. Bilateral common, internal, and external iliac arteries are widely patent. The visualized infrainguinal vasculature is   unremarkable.     Multifocal tree-in-bud and nodular opacities throughout the lungs bilaterally. Additionally there is bronchiectasis and near complete right middle lobar atelectasis and collapse. Findings most likely reflect acute on chronic atypical infection such as   STEVEN. No axillary or mediastinal adenopathy is identified. The thyroid is normal. The esophagus is normal. Pulmonary artery is normal in caliber. Esophagus is unremarkable. No aggressive appearing lytic or sclerotic bone lesions are identified.     The liver is unremarkable. Gallbladder is normal. Pancreas is normal. Spleen is normal. Bilateral adrenal glands are normal. Bilateral kidneys are normal. Bladder is unremarkable. There is diverticulosis without evidence of diverticulitis. Small bowel is   unremarkable. Diffuse osteopenia. No aggressive appearing lytic or sclerotic bone lesions.     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  2.Multifocal tree-in-bud and nodular opacities throughout the lungs bilaterally with bronchiectasis and near complete right middle lobar atelectasis and collapse. Findings most likely reflect acute on chronic atypical infection such as STEVEN.     2/11/25 ECHO:    Clinical Indication    Valvular Disease; Aortic Valve  Assessment   Dx: Aortic stenosis, severe [I35.0 (ICD-10-CM)]; Nonrheumatic mitral valve stenosis [I34.2 (ICD-10-CM)]   Comments: TAVR evaluation, Dr. Macdonald to do GABE     Interpretation Summary         Left ventricular systolic function is normal. Estimated left ventricular EF = 60%    Left ventricular wall thickness is consistent with mild concentric hypertrophy.    Severe aortic valve stenosis is present. Aortic valve area is 1 cm2.  Valve mobility is limited.  Consider BAV    Peak velocity of the flow distal to the aortic valve is 466 cm/s. Aortic valve mean pressure gradient is 57 mmHg.    The aortic valve annulus is 2.3 cm.    Moderate mitral valve regurgitation is present.    Moderate to severe mitral valve stenosis is present. The mitral valve mean gradient is 11 - 12 mmHg.    Trace to mild tricuspid regurgitation.    Estimated right ventricular systolic pressure from tricuspid regurgitation is normal (<35 mmHg).    2/11/25 heart cath:  Indications    Aortic stenosis, severe [I35.0 (ICD-10-CM)]   Nonrheumatic mitral valve stenosis [I34.2 (ICD-10-CM)]         Conclusion         Non flow-limiting coronary artery disease    50% LAD, 30% RCA      I reviewed the patient's new clinical results.    Cancer Staging (if applicable)  Cancer Patient: __ yes __no __unknown; If yes, clinical stage T:__ N:__M:__, stage group or __N/A      Impression: Severe aortic stenosis       Plan: TRANSCATHETER AORTIC VALVE REPLACEMENT; TRANSESOPHAGEAL ECHOCARDIOGRAM WITH ANESTHESIA       LC Hartmann   4/14/2025   08:10 EDT

## 2025-04-14 NOTE — BRIEF OP NOTE
TRANSCATHETER AORTIC VALVE REPLACEMENT, TRANSESOPHAGEAL ECHOCARDIOGRAM WITH ANESTHESIA  Progress Note    Anali Hensley  4/14/2025    Pre-op Diagnosis:   Aortic stenosis, severe [I35.0]       Post-Op Diagnosis Codes:     * Aortic stenosis, severe [I35.0]    Procedure(s):    Procedure(s):  TRANSCATHETER AORTIC VALVE REPLACEMENT  TRANSESOPHAGEAL ECHOCARDIOGRAM WITH ANESTHESIA  Transfemoral Transcatheter Aortic Valve Replacement    Surgeon(s):  Shyanne Macdonald MD Chaney, John H, MD    Anesthesia: General    Staff:   Circulator: Dayna Vásquez RN; Vidal Durand RN; Jacob Freeman RN  Radiology Technologist: Hemanth Puga  Scrub Person: Alexander Villa; Rosalva Blakely  Vendor Representative: Katharine Mendenhall  Nursing Assistant: Jenna Alberts  Perfusion Tech: Ophelia Scott  Invasive Technologist: Mir Garza; Vickey Weston     Estimated Blood Loss: minimal    Urine Voided: * No values recorded between 4/14/2025 10:45 AM and 4/14/2025 11:41 AM *    Specimens:                None      Drains: * No LDAs found *    Findings: Successful TAVR.      Complications: None    Niall Benjamin MD     Date: 4/14/2025  Time: 11:46 EDT

## 2025-04-14 NOTE — ANESTHESIA POSTPROCEDURE EVALUATION
Patient: Anali Hensley    Procedure Summary       Date: 04/14/25 Room / Location: [facility] OR 01 / [facility] HYBRID OR    Anesthesia Start: 1045 Anesthesia Stop: 1155    Procedures:       TRANSCATHETER AORTIC VALVE REPLACEMENT (Chest)      TRANSESOPHAGEAL ECHOCARDIOGRAM WITH ANESTHESIA (Chest)      Transfemoral Transcatheter Aortic Valve Replacement (Chest) Diagnosis:       Aortic stenosis, severe      (Aortic stenosis, severe [I35.0])    Surgeons: Niall Benjamin MD; Shyanne Macdonald MD Provider: Alexander Burks Jr., MD    Anesthesia Type: general, [place] ASA Status: 4            Anesthesia Type: general, [place]    Vitals  No vitals data found for the desired time range.          Post Anesthesia Care and Evaluation    Patient location during evaluation: ICU  Patient participation: complete - patient participated  Level of consciousness: responsive to verbal stimuli  Pain score: 0  Pain management: adequate    Airway patency: patent  Anesthetic complications: No anesthetic complications  PONV Status: none  Cardiovascular status: hemodynamically stable and acceptable  Respiratory status: nonlabored ventilation, acceptable, nasal cannula and spontaneous ventilation  Hydration status: acceptable    Comments: VS monitored en route to ICU and pt spont breathing. VSS upon arrival. Report given  No anesthesia care post op

## 2025-04-14 NOTE — PROGRESS NOTES
Structural Heart Team Meeting Summary       Patient Name: Anali Hensley  YOB: 1935     Referring Physician: Dr. Marley  Admission Status: Inpatient     Attendees: Niall Benjamin MD, Shyanne Macdonald MD, Wilbur Del Valle MD, Chava Clinical Specialist, Maged Kemp, Alexander Burks MD, LC Perdue, and [place] Clinical specialists Ailyn Mendez & Paddy Rasmussen  Primary presentation of AS: Heart Failure   Heart Failure:   HFpEF   NYHA Functional Class: II   LVEF:  60%   ANNULUS Measurement: 2.3cm    Major Organ Compromise: CNS dysfunction (dementia, Alzheimer's, Parkinson's, CVA with persistent limitation) and GI dysfunction (Crohn's, ulcerative colitis, nutritional impairment, serum albumin < 3.0 and liver with any history of cirrhosis, variceal bleeding or elevated INR)    Procedure Specific Impediment: N/A    Other Factors:   Major Nutritional Deficit: No  Cognitive Impairment: Yes, describe: mild dementia  Oxygen dependent: No    STS Risk Score:   Mortality Risk: 10.4%  Mortality and Morbidity Risk: 15.8%    TAVR/TMVR Rationale: Frail, elderly female with severe symptomatic AS and high STS risk score for TAVR. Structural heart team in agreement to proceed with TAVR     Diagnostic Studies Discussed/ Reviewed:  TTE, GABE, LHC, CTA, carotid ultrasound, EKG, CXR, PAT lab results    Procedure planning details:   Valve Size: 23mm Larsen Mirna valve  Cardiology sheath access: Right femoral  CT Surgery sheath access: Left femoral    Post Procedure Considerations: Bleeding at groin sites, monitor for arrhythmias (baseline EKG-NSR with 1st degree AV block & RBBB) and s/s of CVA. Encourage early ambulation and aggressive pulmonary hygiene

## 2025-04-15 ENCOUNTER — APPOINTMENT (OUTPATIENT)
Dept: CARDIOLOGY | Facility: HOSPITAL | Age: 89
DRG: 267 | End: 2025-04-15
Payer: MEDICARE

## 2025-04-15 ENCOUNTER — TRANSCRIBE ORDERS (OUTPATIENT)
Dept: CARDIAC REHAB | Facility: HOSPITAL | Age: 89
End: 2025-04-15
Payer: MEDICARE

## 2025-04-15 DIAGNOSIS — Z95.2 S/P TAVR (TRANSCATHETER AORTIC VALVE REPLACEMENT): Primary | ICD-10-CM

## 2025-04-15 PROBLEM — I44.2 CHB (COMPLETE HEART BLOCK): Status: ACTIVE | Noted: 2025-03-24

## 2025-04-15 LAB
ANION GAP SERPL CALCULATED.3IONS-SCNC: 10 MMOL/L (ref 5–15)
AORTIC DIMENSIONLESS INDEX: 0.53 (DI)
AV MEAN PRESS GRAD SYS DOP V1V2: 44.1 MMHG
AV VMAX SYS DOP: 447.4 CM/SEC
BASOPHILS # BLD AUTO: 0.04 10*3/MM3 (ref 0–0.2)
BASOPHILS NFR BLD AUTO: 0.4 % (ref 0–1.5)
BH CV ECHO MEAS - AO MAX PG: 82.5 MMHG
BH CV ECHO MEAS - AO ROOT DIAM: 3.1 CM
BH CV ECHO MEAS - AO V2 VTI: 91 CM
BH CV ECHO MEAS - AVA(I,D): 1.5 CM2
BH CV ECHO MEAS - EDV(CUBED): 50.7 ML
BH CV ECHO MEAS - EDV(MOD-SP2): 31.4 ML
BH CV ECHO MEAS - EDV(MOD-SP4): 30.8 ML
BH CV ECHO MEAS - EF(MOD-SP2): 83.8 %
BH CV ECHO MEAS - EF(MOD-SP4): 80.5 %
BH CV ECHO MEAS - ESV(CUBED): 8 ML
BH CV ECHO MEAS - ESV(MOD-SP2): 5.1 ML
BH CV ECHO MEAS - ESV(MOD-SP4): 6 ML
BH CV ECHO MEAS - FS: 45.9 %
BH CV ECHO MEAS - IVS/LVPW: 1 CM
BH CV ECHO MEAS - IVSD: 1 CM
BH CV ECHO MEAS - LV DIASTOLIC VOL/BSA (35-75): 20.6 CM2
BH CV ECHO MEAS - LV MASS(C)D: 112.5 GRAMS
BH CV ECHO MEAS - LV MAX PG: 20 MMHG
BH CV ECHO MEAS - LV MEAN PG: 10.3 MMHG
BH CV ECHO MEAS - LV SYSTOLIC VOL/BSA (12-30): 4 CM2
BH CV ECHO MEAS - LV V1 MAX: 223.7 CM/SEC
BH CV ECHO MEAS - LV V1 VTI: 49.8 CM
BH CV ECHO MEAS - LVIDD: 3.7 CM
BH CV ECHO MEAS - LVIDS: 2 CM
BH CV ECHO MEAS - LVOT AREA: 2.27 CM2
BH CV ECHO MEAS - LVOT DIAM: 1.7 CM
BH CV ECHO MEAS - LVPWD: 1 CM
BH CV ECHO MEAS - MR MAX PG: 88.7 MMHG
BH CV ECHO MEAS - MR MAX VEL: 471 CM/SEC
BH CV ECHO MEAS - MR MEAN PG: 40 MMHG
BH CV ECHO MEAS - MR MEAN VEL: 279 CM/SEC
BH CV ECHO MEAS - MR VTI: 91.9 CM
BH CV ECHO MEAS - MV DEC SLOPE: 400.7 CM/SEC2
BH CV ECHO MEAS - MV MAX PG: 23.6 MMHG
BH CV ECHO MEAS - MV MEAN PG: 13 MMHG
BH CV ECHO MEAS - MV P1/2T: 135 MSEC
BH CV ECHO MEAS - MV V2 VTI: 56.2 CM
BH CV ECHO MEAS - MVA(P1/2T): 1.63 CM2
BH CV ECHO MEAS - MVA(VTI): 2.01 CM2
BH CV ECHO MEAS - SV(LVOT): 113.1 ML
BH CV ECHO MEAS - SV(MOD-SP2): 26.3 ML
BH CV ECHO MEAS - SV(MOD-SP4): 24.8 ML
BH CV ECHO MEAS - SVI(LVOT): 75.8 ML/M2
BH CV ECHO MEAS - SVI(MOD-SP2): 17.6 ML/M2
BH CV ECHO MEAS - SVI(MOD-SP4): 16.6 ML/M2
BH CV VAS BP RIGHT ARM: NORMAL MMHG
BUN SERPL-MCNC: 20 MG/DL (ref 8–23)
BUN/CREAT SERPL: 27.4 (ref 7–25)
CALCIUM SPEC-SCNC: 9.1 MG/DL (ref 8.6–10.5)
CHLORIDE SERPL-SCNC: 101 MMOL/L (ref 98–107)
CO2 SERPL-SCNC: 23 MMOL/L (ref 22–29)
CREAT SERPL-MCNC: 0.73 MG/DL (ref 0.57–1)
DEPRECATED RDW RBC AUTO: 46.6 FL (ref 37–54)
EGFRCR SERPLBLD CKD-EPI 2021: 79.7 ML/MIN/1.73
EOSINOPHIL # BLD AUTO: 0.01 10*3/MM3 (ref 0–0.4)
EOSINOPHIL NFR BLD AUTO: 0.1 % (ref 0.3–6.2)
ERYTHROCYTE [DISTWIDTH] IN BLOOD BY AUTOMATED COUNT: 14.3 % (ref 12.3–15.4)
GLUCOSE BLDC GLUCOMTR-MCNC: 141 MG/DL (ref 70–130)
GLUCOSE SERPL-MCNC: 115 MG/DL (ref 65–99)
HCT VFR BLD AUTO: 33.6 % (ref 34–46.6)
HGB BLD-MCNC: 11.2 G/DL (ref 12–15.9)
IMM GRANULOCYTES # BLD AUTO: 0.04 10*3/MM3 (ref 0–0.05)
IMM GRANULOCYTES NFR BLD AUTO: 0.4 % (ref 0–0.5)
IVRT: 123 MS
LV EF 2D ECHO EST: 75 %
LV EF BIPLANE MOD: 82.4 %
LYMPHOCYTES # BLD AUTO: 1.22 10*3/MM3 (ref 0.7–3.1)
LYMPHOCYTES NFR BLD AUTO: 10.8 % (ref 19.6–45.3)
MAGNESIUM SERPL-MCNC: 1.8 MG/DL (ref 1.6–2.4)
MCH RBC QN AUTO: 29.9 PG (ref 26.6–33)
MCHC RBC AUTO-ENTMCNC: 33.3 G/DL (ref 31.5–35.7)
MCV RBC AUTO: 89.6 FL (ref 79–97)
MONOCYTES # BLD AUTO: 1.14 10*3/MM3 (ref 0.1–0.9)
MONOCYTES NFR BLD AUTO: 10.1 % (ref 5–12)
NEUTROPHILS NFR BLD AUTO: 78.2 % (ref 42.7–76)
NEUTROPHILS NFR BLD AUTO: 8.85 10*3/MM3 (ref 1.7–7)
NRBC BLD AUTO-RTO: 0 /100 WBC (ref 0–0.2)
PLATELET # BLD AUTO: 176 10*3/MM3 (ref 140–450)
PMV BLD AUTO: 11.9 FL (ref 6–12)
POTASSIUM SERPL-SCNC: 4.4 MMOL/L (ref 3.5–5.2)
QT INTERVAL: 404 MS
QTC INTERVAL: 457 MS
RBC # BLD AUTO: 3.75 10*6/MM3 (ref 3.77–5.28)
SODIUM SERPL-SCNC: 134 MMOL/L (ref 136–145)
WBC NRBC COR # BLD AUTO: 11.3 10*3/MM3 (ref 3.4–10.8)

## 2025-04-15 PROCEDURE — 25810000003 SODIUM CHLORIDE 0.9 % SOLUTION 250 ML FLEX CONT

## 2025-04-15 PROCEDURE — 25010000002 MAGNESIUM SULFATE 4 GM/100ML SOLUTION: Performed by: THORACIC SURGERY (CARDIOTHORACIC VASCULAR SURGERY)

## 2025-04-15 PROCEDURE — 02HK3NZ INSERTION OF INTRACARDIAC PACEMAKER INTO RIGHT VENTRICLE, PERCUTANEOUS APPROACH: ICD-10-PCS | Performed by: INTERNAL MEDICINE

## 2025-04-15 PROCEDURE — 93010 ELECTROCARDIOGRAM REPORT: CPT | Performed by: INTERNAL MEDICINE

## 2025-04-15 PROCEDURE — 25010000002 MIDAZOLAM PER 1 MG: Performed by: INTERNAL MEDICINE

## 2025-04-15 PROCEDURE — 93321 DOPPLER ECHO F-UP/LMTD STD: CPT | Performed by: INTERNAL MEDICINE

## 2025-04-15 PROCEDURE — C1760 CLOSURE DEV, VASC: HCPCS | Performed by: INTERNAL MEDICINE

## 2025-04-15 PROCEDURE — 99231 SBSQ HOSP IP/OBS SF/LOW 25: CPT | Performed by: THORACIC SURGERY (CARDIOTHORACIC VASCULAR SURGERY)

## 2025-04-15 PROCEDURE — 93308 TTE F-UP OR LMTD: CPT | Performed by: INTERNAL MEDICINE

## 2025-04-15 PROCEDURE — 25010000002 VANCOMYCIN 750 MG RECONSTITUTED SOLUTION 1 EACH VIAL

## 2025-04-15 PROCEDURE — C1786 PMKR, SINGLE, RATE-RESP: HCPCS | Performed by: INTERNAL MEDICINE

## 2025-04-15 PROCEDURE — 25810000003 SODIUM CHLORIDE 0.9 % SOLUTION 250 ML FLEX CONT: Performed by: PHYSICIAN ASSISTANT

## 2025-04-15 PROCEDURE — 93308 TTE F-UP OR LMTD: CPT

## 2025-04-15 PROCEDURE — 80048 BASIC METABOLIC PNL TOTAL CA: CPT | Performed by: PHYSICIAN ASSISTANT

## 2025-04-15 PROCEDURE — 99152 MOD SED SAME PHYS/QHP 5/>YRS: CPT | Performed by: INTERNAL MEDICINE

## 2025-04-15 PROCEDURE — 93321 DOPPLER ECHO F-UP/LMTD STD: CPT

## 2025-04-15 PROCEDURE — 25010000002 ONDANSETRON PER 1 MG: Performed by: INTERNAL MEDICINE

## 2025-04-15 PROCEDURE — 25010000002 NICARDIPINE 2.5 MG/ML SOLUTION 10 ML VIAL: Performed by: PHYSICIAN ASSISTANT

## 2025-04-15 PROCEDURE — 85025 COMPLETE CBC W/AUTO DIFF WBC: CPT | Performed by: INTERNAL MEDICINE

## 2025-04-15 PROCEDURE — P9041 ALBUMIN (HUMAN),5%, 50ML: HCPCS | Performed by: INTERNAL MEDICINE

## 2025-04-15 PROCEDURE — 93325 DOPPLER ECHO COLOR FLOW MAPG: CPT | Performed by: INTERNAL MEDICINE

## 2025-04-15 PROCEDURE — 99232 SBSQ HOSP IP/OBS MODERATE 35: CPT | Performed by: INTERNAL MEDICINE

## 2025-04-15 PROCEDURE — C1769 GUIDE WIRE: HCPCS | Performed by: INTERNAL MEDICINE

## 2025-04-15 PROCEDURE — 93325 DOPPLER ECHO COLOR FLOW MAPG: CPT

## 2025-04-15 PROCEDURE — C1894 INTRO/SHEATH, NON-LASER: HCPCS | Performed by: INTERNAL MEDICINE

## 2025-04-15 PROCEDURE — 93005 ELECTROCARDIOGRAM TRACING: CPT | Performed by: PHYSICIAN ASSISTANT

## 2025-04-15 PROCEDURE — 83735 ASSAY OF MAGNESIUM: CPT | Performed by: INTERNAL MEDICINE

## 2025-04-15 PROCEDURE — 33274 TCAT INSJ/RPL PERM LDLS PM: CPT | Performed by: INTERNAL MEDICINE

## 2025-04-15 PROCEDURE — 25510000001 IOPAMIDOL PER 1 ML: Performed by: INTERNAL MEDICINE

## 2025-04-15 PROCEDURE — 25010000002 ALBUMIN HUMAN 5% PER 50 ML: Performed by: INTERNAL MEDICINE

## 2025-04-15 PROCEDURE — 99153 MOD SED SAME PHYS/QHP EA: CPT | Performed by: INTERNAL MEDICINE

## 2025-04-15 PROCEDURE — 25010000002 FENTANYL CITRATE (PF) 50 MCG/ML SOLUTION: Performed by: INTERNAL MEDICINE

## 2025-04-15 DEVICE — SYS PACE MICRA LD/LESS AV2: Type: IMPLANTABLE DEVICE | Status: FUNCTIONAL

## 2025-04-15 RX ORDER — VANCOMYCIN HYDROCHLORIDE 1 G/200ML
INJECTION, SOLUTION INTRAVENOUS
Status: COMPLETED | OUTPATIENT
Start: 2025-04-15 | End: 2025-04-15

## 2025-04-15 RX ORDER — MIDAZOLAM HYDROCHLORIDE 1 MG/ML
INJECTION, SOLUTION INTRAMUSCULAR; INTRAVENOUS
Status: DISCONTINUED | OUTPATIENT
Start: 2025-04-15 | End: 2025-04-15 | Stop reason: HOSPADM

## 2025-04-15 RX ORDER — ALBUMIN HUMAN 50 G/1000ML
250 SOLUTION INTRAVENOUS ONCE
Status: COMPLETED | OUTPATIENT
Start: 2025-04-15 | End: 2025-04-15

## 2025-04-15 RX ORDER — MAGNESIUM SULFATE HEPTAHYDRATE 40 MG/ML
4 INJECTION, SOLUTION INTRAVENOUS ONCE
Status: COMPLETED | OUTPATIENT
Start: 2025-04-15 | End: 2025-04-15

## 2025-04-15 RX ORDER — ONDANSETRON 2 MG/ML
INJECTION INTRAMUSCULAR; INTRAVENOUS
Status: DISCONTINUED | OUTPATIENT
Start: 2025-04-15 | End: 2025-04-15 | Stop reason: HOSPADM

## 2025-04-15 RX ORDER — IOPAMIDOL 755 MG/ML
INJECTION, SOLUTION INTRAVASCULAR
Status: DISCONTINUED | OUTPATIENT
Start: 2025-04-15 | End: 2025-04-15 | Stop reason: HOSPADM

## 2025-04-15 RX ORDER — FENTANYL CITRATE 50 UG/ML
INJECTION, SOLUTION INTRAMUSCULAR; INTRAVENOUS
Status: DISCONTINUED | OUTPATIENT
Start: 2025-04-15 | End: 2025-04-15 | Stop reason: HOSPADM

## 2025-04-15 RX ORDER — ETOMIDATE 2 MG/ML
INJECTION INTRAVENOUS
Status: DISCONTINUED | OUTPATIENT
Start: 2025-04-15 | End: 2025-04-15 | Stop reason: HOSPADM

## 2025-04-15 RX ADMIN — MAGNESIUM SULFATE IN WATER FOR 4 G: 40 INJECTION INTRAVENOUS at 05:32

## 2025-04-15 RX ADMIN — BRINZOLAMIDE 1 DROP: 10 SUSPENSION/ DROPS OPHTHALMIC at 21:18

## 2025-04-15 RX ADMIN — SODIUM CHLORIDE 1 MG/HR: 9 INJECTION, SOLUTION INTRAVENOUS at 21:48

## 2025-04-15 RX ADMIN — ALBUMIN (HUMAN) 250 ML: 12.5 INJECTION, SOLUTION INTRAVENOUS at 14:12

## 2025-04-15 RX ADMIN — BRINZOLAMIDE 1 DROP: 10 SUSPENSION/ DROPS OPHTHALMIC at 09:50

## 2025-04-15 RX ADMIN — ASPIRIN 81 MG: 81 TABLET, COATED ORAL at 08:10

## 2025-04-15 RX ADMIN — DONEPEZIL HYDROCHLORIDE 10 MG: 10 TABLET, FILM COATED ORAL at 08:10

## 2025-04-15 RX ADMIN — MUPIROCIN 1 APPLICATION: 20 OINTMENT TOPICAL at 21:18

## 2025-04-15 RX ADMIN — MUPIROCIN 1 APPLICATION: 20 OINTMENT TOPICAL at 08:10

## 2025-04-15 RX ADMIN — BRIMONIDINE TARTRATE AND TIMOLOL MALEATE 1 DROP: 2; 5 SOLUTION OPHTHALMIC at 21:18

## 2025-04-15 RX ADMIN — MIRTAZAPINE 45 MG: 15 TABLET, FILM COATED ORAL at 21:17

## 2025-04-15 RX ADMIN — BRIMONIDINE TARTRATE AND TIMOLOL MALEATE 1 DROP: 2; 5 SOLUTION OPHTHALMIC at 09:51

## 2025-04-15 NOTE — PROGRESS NOTES
"CTS Progress Note      POD # 1 s/p TAVR     LOS: 1 day     Subjective  Possible PPM today with EP. On room air.  Little sleep overnight per nursing    Objective    Vital Signs  Temp:  [93.2 °F (34 °C)-98.8 °F (37.1 °C)] 97.9 °F (36.6 °C)  Heart Rate:  [57-85] 85  Resp:  [16-18] 16  BP: ()/(50-90) 147/72  Arterial Line BP: ()/(33-60) 135/56    Physical Exam:   General Appearance: alert, appears stated age and cooperative   Lungs: clear to auscultation     Heart: regular rhythm & normal rate, normal S1, S2 and no murmur, no gallop, no rub   Extremities: groins soft, no hematoma     Results     Results from last 7 days   Lab Units 04/15/25  0402   WBC 10*3/mm3 11.30*   HEMOGLOBIN g/dL 11.2*   HEMATOCRIT % 33.6*   PLATELETS 10*3/mm3 176     Results from last 7 days   Lab Units 04/15/25  0402   SODIUM mmol/L 134*   POTASSIUM mmol/L 4.4   CHLORIDE mmol/L 101   CO2 mmol/L 23.0   BUN mg/dL 20   CREATININE mg/dL 0.73   GLUCOSE mg/dL 115*   CALCIUM mg/dL 9.1       Imaging Results (Last 24 Hours)       ** No results found for the last 24 hours. **            Assessment    Aortic stenosis, severe    Depression    Hypertension    GERD (gastroesophageal reflux disease)    Bronchiectasis    Abnormal chest CT (Bronchiectasis, partila RML collapse, \"Tree-in-bud\" opacities)    Severe aortic stenosis    Severe aortic valve stenosis      Plan   Ambulate  Pulmonary toilet  Possible PPM per Cardiology  Discharge later today after pacemaker    Niall Benjamin MD  04/15/25  06:48 EDT      "

## 2025-04-15 NOTE — PROGRESS NOTES
Patient is confused today.  Spoke with daughter (POA) about need for permanent pacemaker.  Discussed transvenous versus leadless pacemaker options.  Ultimately decided to go with leadless pacemaker. Daughter consents to proceed with micra leadless pacemaker. Procedure, risk, and alternatives have been discussed with the daughter and she is agreeable to proceed.  Patient received 2 g Ancef for surgical prophylaxis. Further recommendations to follow.     LC Klein

## 2025-04-15 NOTE — PROGRESS NOTES
Echocardiogram reviewed.  The patient is hyperdynamic with an EF of at least 75-80%.  The patient has systolic anterior motion of the mitral valve which precludes getting an accurate aortic valve gradient or outflow tract gradient.  Due to her significant hyperdynamic state I will give 250 mL of 5% albumin.  Shyanne Macdonald MD, FACC

## 2025-04-15 NOTE — CASE MANAGEMENT/SOCIAL WORK
Discharge Planning Assessment  [facility]     Patient Name: Anali Hensley  MRN: [id number]  Today's Date: 4/15/2025    Admit Date: 4/14/2025    Plan: TBD   Discharge Needs Assessment       Row Name 04/15/25 0828       Living Environment    People in Home child(ge), adult    Name(s) of People in Home Aleida Hernadez (daughter) [phone]    Current Living Arrangements home    Potentially Unsafe Housing Conditions none    In the past 12 months has the electric, gas, oil, or water company threatened to shut off services in your home? No    Primary Care Provided by self    Provides Primary Care For no one    Family Caregiver if Needed child(ge), adult    Able to Return to Prior Arrangements yes       Resource/Environmental Concerns    Resource/Environmental Concerns none    Transportation Concerns none       Transportation Needs    In the past 12 months, has lack of transportation kept you from medical appointments or from getting medications? no    In the past 12 months, has lack of transportation kept you from meetings, work, or from getting things needed for daily living? No       Food Insecurity    Within the past 12 months, you worried that your food would run out before you got the money to buy more. Never true    Within the past 12 months, the food you bought just didn't last and you didn't have money to get more. Never true       Transition Planning    Patient/Family Anticipates Transition to home with family    Patient/Family Anticipated Services at Transition     Transportation Anticipated family or friend will provide       Discharge Needs Assessment    Readmission Within the Last 30 Days no previous admission in last 30 days    Equipment Currently Used at Home cane, straight    Concerns to be Addressed denies needs/concerns at this time    Do you want help finding or keeping work or a job? I do not need or want help    Do you want help with school or training? For example, starting or  completing job training or getting a high school diploma, GED or equivalent Yes    Anticipated Changes Related to Illness none    Equipment Needed After Discharge none                   Discharge Plan       Row Name 04/15/25 0829       Plan    Plan TBD    Patient/Family in Agreement with Plan yes    Plan Comments Spoke to patient at bedside. Lives with Aleida Hernadez (daughter) [phone] in [place]. Is independent with ADL's. No problems affording [employer] Medicare or medications. Uses [facility]. Uses a straight cane. PCP is by Sagar Mesa MD. Plan is TBD. Daughter will transport. CM will continue to follow.    Final Discharge Disposition Code 01 - home or self-care                    Expected Discharge Date and Time       Expected Discharge Date Expected Discharge Time    Apr 15, 2025            Demographic Summary       Row Name 04/15/25 0827       General Information    Admission Type inpatient    Arrived From PACU/recovery room    Referral Source admission list    Reason for Consult discharge planning    Preferred Language English       Contact Information    Permission Granted to Share Info With     Contact Information Obtained for                    Functional Status       Row Name 04/15/25 0827       Functional Status    Usual Activity Tolerance moderate    Current Activity Tolerance moderate       Physical Activity    On average, how many days per week do you engage in moderate to strenuous exercise (like a brisk walk)? 0 days    On average, how many minutes do you engage in exercise at this level? 0 min    Number of minutes of exercise per week 0       Functional Status, IADL    Medications independent    Meal Preparation independent    Housekeeping assistive person    Laundry assistive person    Shopping assistive person    If for any reason you need help with day-to-day activities such as bathing, preparing meals, shopping, managing finances, etc., do you get  the help you need? I don't need any help       Mental Status    General Appearance WDL WDL       Mental Status Summary    Recent Changes in Mental Status/Cognitive Functioning no changes       Employment/    Employment Status retired                   Psychosocial    No documentation.                  Abuse/Neglect    No documentation.                  Legal    No documentation.                  Substance Abuse    No documentation.                  Patient Forms    No documentation.                     Rajendra Smith, RN

## 2025-04-15 NOTE — PROGRESS NOTES
Pt. Referred for Phase II Cardiac Rehab. Staff discussed benefits of exercise, program protocol, and educational material provided. Teach back verified.  Permission granted from patient for staff to fax referral information to outlying program at this time.  Staff faxed referral info to [facility].

## 2025-04-15 NOTE — PROGRESS NOTES
"[facility] Cardiology Daily Note       LOS: 1 day   Patient Care Team:  Sagar Mesa MD as PCP - General (Family Medicine)  Hudson Marley MD as Consulting Physician (Cardiology)  Em Hoyt APRN as Nurse Practitioner (Cardiology)  Niall Benjamin MD as Surgeon (Cardiothoracic Surgery)    Chief Complaint: Day 1 status post TAVR    Subjective     Subjective: Having a very difficult time hearing this morning.  Her hearing aids are with her daughter.  95% on room air.  Blood pressures have been good overnight.  Heart rates in the 80s.  This morning her EKG again shows a first-degree AV block and right bundle branch block.  Yesterday postprocedure she had a left bundle branch block.    Review of Systems:   As above.    Medications:  bimatoprost, 1 drop, Both Eyes, Nightly  brimonidine-timolol, 1 drop, Both Eyes, Q12H  bimatoprost, 1 drop, Both Eyes, Nightly  brinzolamide, 1 drop, Both Eyes, Q12H  aspirin, 81 mg, Oral, Daily  donepezil, 10 mg, Oral, Daily  magnesium sulfate, 4 g, Intravenous, Once  mirtazapine, 45 mg, Oral, Nightly  mupirocin, 1 Application, Each Nare, BID  senna-docusate sodium, 2 tablet, Oral, Nightly        Objective     Vital Sign Min/Max for last 24 hours  Temp  Min: 93.2 °F (34 °C)  Max: 98.8 °F (37.1 °C)   BP  Min: 89/74  Max: 147/72   Pulse  Min: 57  Max: 85   Resp  Min: 16  Max: 18   SpO2  Min: 92 %  Max: 98 %   Flow (L/min) (Oxygen Therapy)  Min: 2  Max: 2   Weight  Min: 53.8 kg (118 lb 9.7 oz)  Max: 53.8 kg (118 lb 9.7 oz)      Intake/Output Summary (Last 24 hours) at 4/15/2025 0700  Last data filed at 4/15/2025 0532  Gross per 24 hour   Intake 920 ml   Output 1135 ml   Net -215 ml        Flowsheet Rows      Flowsheet Row First Filed Value   Admission Height 152.4 cm (60\") Documented at 04/15/2025 0623   Admission Weight 53.8 kg (118 lb 9.7 oz) Documented at 04/15/2025 0623            Physical Exam:    General: Awake/alert   Cardiovascular: Heart has a " "nondisplaced focal PMI. Regular rate and rhythm with 2/6 SE murmur, no gallop or rub.  Lungs: Clear without rales or wheezes. Equal expansion is noted.   Abdomen: Soft, nontender.  Extremities: Show no edema.   Skin: warm and dry.     Results Review:    I reviewed the patient's new clinical results.  EKG:  Tele: Sinus rhythm right bundle branch block first-degree AV block.  Right bundle branch block has now replaced left bundle branch block.    Labs:    Results from last 7 days   Lab Units 04/15/25  0402 04/14/25  1212 04/11/25  1340   SODIUM mmol/L 134* 133* 134*   POTASSIUM mmol/L 4.4 4.0 4.5   CHLORIDE mmol/L 101 102 98   CO2 mmol/L 23.0 24.0 26.0   BUN mg/dL 20 19 22   CREATININE mg/dL 0.73 0.57 0.68   CALCIUM mg/dL 9.1 8.2* 9.6   BILIRUBIN mg/dL  --   --  0.3   ALK PHOS U/L  --   --  89   ALT (SGPT) U/L  --   --  10   AST (SGOT) U/L  --   --  18   GLUCOSE mg/dL 115* 121* 178*     Results from last 7 days   Lab Units 04/15/25  0402 04/14/25  1212 04/11/25  1340   WBC 10*3/mm3 11.30* 4.75 6.19   HEMOGLOBIN g/dL 11.2* 11.2* 12.1   HEMATOCRIT % 33.6* 34.8 37.9   PLATELETS 10*3/mm3 176 174 236     No results found for: \"TROPONINI\", \"TROPONINT\"  Lab Results   Component Value Date    CHOL 118 02/11/2025     Lab Results   Component Value Date    TRIG 39 02/11/2025     Lab Results   Component Value Date    HDL 49 02/11/2025     No components found for: \"LDLCALC\"  Lab Results   Component Value Date    INR 1.11 04/11/2025    PROTIME 15.0 04/11/2025         Ejection Fraction:    Assessment   Assessment:    Severe aortic stenosis status post 20 mm CATHERINE 3 pericardial prosthesis on 4/14/2025  RBBB, first-degree AV block  Post 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 Poole.  Due to alternating left and right bundle branch block we both agree that a pacemaker is the patient's best option.  I think a Micra AV is appropriate since the " patient may not need any kind of backup pacing.    Shyanne Macdonald MD  04/15/25  07:00 EDT

## 2025-04-15 NOTE — PROGRESS NOTES
Intensive Care Follow-up     Hospital:  LOS: 1 day   Ms. Anali Hensley, 89 y.o. female is followed for:   Aortic stenosis, severe   Postoperative hemodynamic, electrolyte and respiratory management         History of present illness:   89 year-old female with aortic stenosis, bronchiectasis, anxiety, depression, and HTN that presents to [facility] ICU postoperatively from scheduled TAVR today with Dr. Benjamin.     Patient has been experiencing symptoms of lower extremity swelling and mild worsening of fatigue. With the above symptoms and severe aortic stenosis, patient referred for TAVR evaluation and underwent work-up. GABE showed severe AS. LHC with non-flow limiting CAD. Carotid duplex with 50% stenosis bilaterally. TAVR CTA noted widely patent vasculature, bilateral bronchiectasis and near complete middle lobe atelectasis and collapse suggestive of acute on chronic atypical infection such as STEVEN. PFTs not available.    Due to the above lung findings on imaging, she was referred to Dr. Morrissey in Pulmonary Office for evaluation. Her symptoms reported were baseline, thus, he felt nothing to add from a pulmonary standpoint.      Patient underwent transcatheter aortic valve replacement.  Course was complicated by complete heart block requiring temporary transvenous pacemaker.      Subjective   Interval History:  Overnight did well.  Did not require any pacing.  States that she is ready to go home.  Cardiology team is following and plan is for permanent pacemaker placement.               The patient's past medical, surgical and social history were reviewed and updated in Epic as appropriate.       Objective     Infusions:  niCARdipine, 5-15 mg/hr  phenylephrine, 0.5-3 mcg/kg/min      Medications:  bimatoprost, 1 drop, Both Eyes, Nightly  brimonidine-timolol, 1 drop, Both Eyes, Q12H  brinzolamide, 1 drop, Both Eyes, Q12H  aspirin, 81 mg, Oral, Daily  donepezil, 10 mg, Oral, Daily  mirtazapine, 45 mg, Oral,  "Nightly  mupirocin, 1 Application, Each Nare, BID  senna-docusate sodium, 2 tablet, Oral, Nightly        Vital Sign Min/Max for last 24 hours  Temp  Min: 93.2 °F (34 °C)  Max: 98.8 °F (37.1 °C)   BP  Min: 89/74  Max: 147/72   Pulse  Min: 57  Max: 90   Resp  Min: 16  Max: 18   SpO2  Min: 92 %  Max: 98 %   Flow (L/min) (Oxygen Therapy)  Min: 2  Max: 2       Input/Output for last 24 hour shift  04/14 0701 - 04/15 0700  In: 920 [P.O.:120; I.V.:800]  Out: 1135 [Urine:1135]   S RR:  [5-12] 5  Objective:  Vital signs: (most recent): Blood pressure 139/64, pulse 80, temperature 98.4 °F (36.9 °C), temperature source Bladder, resp. rate 16, height 152.4 cm (60\"), weight 53.8 kg (118 lb 9.7 oz), SpO2 93%.            General Appearance:  Awake, alert, in no acute distress  Lungs:   B/L Breath sounds present with decreased breath sounds on bases, no wheezing heard, no crackles.   Heart: S1 and S2 present, 2/6 systolic murmur aortic area  Abdomen: Soft, nontender, no guarding or rigidity, bowel sounds positive.  Extremities:  no edema, warm to touch.Arterial and venous sheaths in place.   Neurologic:  Moving all four extremities.  No obvious focal deficits.    Results from last 7 days   Lab Units 04/15/25  0402 04/14/25  1212 04/11/25  1340   WBC 10*3/mm3 11.30* 4.75 6.19   HEMOGLOBIN g/dL 11.2* 11.2* 12.1   PLATELETS 10*3/mm3 176 174 236     Results from last 7 days   Lab Units 04/15/25  0402 04/14/25  1212 04/11/25  1340   SODIUM mmol/L 134* 133* 134*   POTASSIUM mmol/L 4.4 4.0 4.5   CO2 mmol/L 23.0 24.0 26.0   BUN mg/dL 20 19 22   CREATININE mg/dL 0.73 0.57 0.68   MAGNESIUM mg/dL 1.8  --  1.9   GLUCOSE mg/dL 115* 121* 178*     Estimated Creatinine Clearance: 46.1 mL/min (by C-G formula based on SCr of 0.73 mg/dL).          Images:   None new    I reviewed the patient's results and images.     Assessment & Plan   Impression        Aortic stenosis, severe    Depression    Hypertension    GERD (gastroesophageal reflux disease)    " "Bronchiectasis    Abnormal chest CT (Bronchiectasis, partila RML collapse, \"Tree-in-bud\" opacities)    Severe aortic stenosis    Severe aortic valve stenosis       Plan        1.  Patient is post TAVR.  CT surgery and cardiology team following closely.  Course is complicated by complete heart block requiring intermittent temporary transvenous pacemaker placement.  Patient has alternating right and left bundle branch block as well as first-degree AV block.  EP team has been consulted.  Plan is to place permanent pacemaker.  2.  Neurologically patient is motor intact.  No obvious focal deficit.  Continue to monitor closely.  3.  Labs checked.  Mild hyponatremia and stable currently.  Continue to trend.  Will monitor electrolytes and replace as needed per ICU protocol. Monitor urine output.  4.  Patient has slight worsening leukocytosis.  Likely postop stress response. Perioperative antibiotics given.  5.  Echocardiogram pending.  6.  Patient's home medication for dementia and depression started including donezepil and mirtazapine.    Will closely monitor in ICU.  Discussed with nursing staff.    Plan of care and goals reviewed with multidisciplinary/antibiotic stewardship team during rounds.   I discussed the patient's findings and my recommendations with nursing staff   Above documentation reviewed and reflect accurate information as of 4/15/2025 including copied elements of the note.     Johnson Burden MD, [facility]  Pulmonary, Critical care and Sleep Medicine      "

## 2025-04-16 ENCOUNTER — DOCUMENTATION (OUTPATIENT)
Dept: CARDIOLOGY | Facility: HOSPITAL | Age: 89
End: 2025-04-16
Payer: MEDICARE

## 2025-04-16 ENCOUNTER — APPOINTMENT (OUTPATIENT)
Dept: GENERAL RADIOLOGY | Facility: HOSPITAL | Age: 89
DRG: 267 | End: 2025-04-16
Payer: MEDICARE

## 2025-04-16 ENCOUNTER — READMISSION MANAGEMENT (OUTPATIENT)
Dept: CALL CENTER | Facility: HOSPITAL | Age: 89
End: 2025-04-16
Payer: MEDICARE

## 2025-04-16 ENCOUNTER — TELEPHONE (OUTPATIENT)
Dept: CARDIOLOGY | Facility: HOSPITAL | Age: 89
End: 2025-04-16
Payer: MEDICARE

## 2025-04-16 VITALS
DIASTOLIC BLOOD PRESSURE: 62 MMHG | TEMPERATURE: 98.4 F | HEART RATE: 70 BPM | HEIGHT: 60 IN | RESPIRATION RATE: 20 BRPM | BODY MASS INDEX: 23.29 KG/M2 | WEIGHT: 118.61 LBS | SYSTOLIC BLOOD PRESSURE: 99 MMHG | OXYGEN SATURATION: 94 %

## 2025-04-16 LAB
ANION GAP SERPL CALCULATED.3IONS-SCNC: 12 MMOL/L (ref 5–15)
BASOPHILS # BLD AUTO: 0.06 10*3/MM3 (ref 0–0.2)
BASOPHILS NFR BLD AUTO: 0.4 % (ref 0–1.5)
BUN SERPL-MCNC: 12 MG/DL (ref 8–23)
BUN/CREAT SERPL: 22.6 (ref 7–25)
CALCIUM SPEC-SCNC: 9.2 MG/DL (ref 8.6–10.5)
CHLORIDE SERPL-SCNC: 100 MMOL/L (ref 98–107)
CO2 SERPL-SCNC: 23 MMOL/L (ref 22–29)
CREAT SERPL-MCNC: 0.53 MG/DL (ref 0.57–1)
DEPRECATED RDW RBC AUTO: 48.6 FL (ref 37–54)
EGFRCR SERPLBLD CKD-EPI 2021: 89.6 ML/MIN/1.73
EOSINOPHIL # BLD AUTO: 0.07 10*3/MM3 (ref 0–0.4)
EOSINOPHIL NFR BLD AUTO: 0.5 % (ref 0.3–6.2)
ERYTHROCYTE [DISTWIDTH] IN BLOOD BY AUTOMATED COUNT: 14.5 % (ref 12.3–15.4)
GLUCOSE SERPL-MCNC: 141 MG/DL (ref 65–99)
HCT VFR BLD AUTO: 36.2 % (ref 34–46.6)
HGB BLD-MCNC: 11.8 G/DL (ref 12–15.9)
IMM GRANULOCYTES # BLD AUTO: 0.05 10*3/MM3 (ref 0–0.05)
IMM GRANULOCYTES NFR BLD AUTO: 0.4 % (ref 0–0.5)
LYMPHOCYTES # BLD AUTO: 0.84 10*3/MM3 (ref 0.7–3.1)
LYMPHOCYTES NFR BLD AUTO: 6.3 % (ref 19.6–45.3)
MAGNESIUM SERPL-MCNC: 1.9 MG/DL (ref 1.6–2.4)
MCH RBC QN AUTO: 30.1 PG (ref 26.6–33)
MCHC RBC AUTO-ENTMCNC: 32.6 G/DL (ref 31.5–35.7)
MCV RBC AUTO: 92.3 FL (ref 79–97)
MONOCYTES # BLD AUTO: 1.2 10*3/MM3 (ref 0.1–0.9)
MONOCYTES NFR BLD AUTO: 9 % (ref 5–12)
NEUTROPHILS NFR BLD AUTO: 11.14 10*3/MM3 (ref 1.7–7)
NEUTROPHILS NFR BLD AUTO: 83.4 % (ref 42.7–76)
NRBC BLD AUTO-RTO: 0 /100 WBC (ref 0–0.2)
PLATELET # BLD AUTO: 151 10*3/MM3 (ref 140–450)
PMV BLD AUTO: 12 FL (ref 6–12)
POTASSIUM SERPL-SCNC: 4 MMOL/L (ref 3.5–5.2)
QT INTERVAL: 380 MS
QTC INTERVAL: 480 MS
RBC # BLD AUTO: 3.92 10*6/MM3 (ref 3.77–5.28)
SODIUM SERPL-SCNC: 135 MMOL/L (ref 136–145)
WBC NRBC COR # BLD AUTO: 13.36 10*3/MM3 (ref 3.4–10.8)

## 2025-04-16 PROCEDURE — 97530 THERAPEUTIC ACTIVITIES: CPT

## 2025-04-16 PROCEDURE — 99024 POSTOP FOLLOW-UP VISIT: CPT

## 2025-04-16 PROCEDURE — 83735 ASSAY OF MAGNESIUM: CPT | Performed by: THORACIC SURGERY (CARDIOTHORACIC VASCULAR SURGERY)

## 2025-04-16 PROCEDURE — 93005 ELECTROCARDIOGRAM TRACING: CPT | Performed by: PHYSICIAN ASSISTANT

## 2025-04-16 PROCEDURE — 99232 SBSQ HOSP IP/OBS MODERATE 35: CPT | Performed by: INTERNAL MEDICINE

## 2025-04-16 PROCEDURE — 93010 ELECTROCARDIOGRAM REPORT: CPT | Performed by: INTERNAL MEDICINE

## 2025-04-16 PROCEDURE — 80048 BASIC METABOLIC PNL TOTAL CA: CPT | Performed by: INTERNAL MEDICINE

## 2025-04-16 PROCEDURE — 25010000002 MAGNESIUM SULFATE 4 GM/100ML SOLUTION: Performed by: THORACIC SURGERY (CARDIOTHORACIC VASCULAR SURGERY)

## 2025-04-16 PROCEDURE — 97162 PT EVAL MOD COMPLEX 30 MIN: CPT

## 2025-04-16 PROCEDURE — 71046 X-RAY EXAM CHEST 2 VIEWS: CPT

## 2025-04-16 PROCEDURE — 85025 COMPLETE CBC W/AUTO DIFF WBC: CPT | Performed by: INTERNAL MEDICINE

## 2025-04-16 RX ORDER — SODIUM CHLORIDE 9 MG/ML
40 INJECTION, SOLUTION INTRAVENOUS AS NEEDED
Status: DISCONTINUED | OUTPATIENT
Start: 2025-04-16 | End: 2025-04-16 | Stop reason: HOSPADM

## 2025-04-16 RX ORDER — POLYVINYL ALCOHOL 14 MG/ML
2 SOLUTION/ DROPS OPHTHALMIC
Status: DISCONTINUED | OUTPATIENT
Start: 2025-04-16 | End: 2025-04-16 | Stop reason: HOSPADM

## 2025-04-16 RX ORDER — SODIUM CHLORIDE 0.9 % (FLUSH) 0.9 %
10 SYRINGE (ML) INJECTION AS NEEDED
Status: DISCONTINUED | OUTPATIENT
Start: 2025-04-16 | End: 2025-04-16 | Stop reason: HOSPADM

## 2025-04-16 RX ORDER — SODIUM CHLORIDE 0.9 % (FLUSH) 0.9 %
10 SYRINGE (ML) INJECTION EVERY 12 HOURS SCHEDULED
Status: DISCONTINUED | OUTPATIENT
Start: 2025-04-16 | End: 2025-04-16 | Stop reason: HOSPADM

## 2025-04-16 RX ORDER — NITROGLYCERIN 0.4 MG/1
0.4 TABLET SUBLINGUAL
Status: DISCONTINUED | OUTPATIENT
Start: 2025-04-16 | End: 2025-04-16 | Stop reason: SDUPTHER

## 2025-04-16 RX ORDER — MAGNESIUM SULFATE HEPTAHYDRATE 40 MG/ML
4 INJECTION, SOLUTION INTRAVENOUS ONCE
Status: COMPLETED | OUTPATIENT
Start: 2025-04-16 | End: 2025-04-16

## 2025-04-16 RX ADMIN — DONEPEZIL HYDROCHLORIDE 10 MG: 10 TABLET, FILM COATED ORAL at 08:21

## 2025-04-16 RX ADMIN — BRIMONIDINE TARTRATE AND TIMOLOL MALEATE 1 DROP: 2; 5 SOLUTION OPHTHALMIC at 10:48

## 2025-04-16 RX ADMIN — MAGNESIUM SULFATE IN WATER FOR 4 G: 40 INJECTION INTRAVENOUS at 06:10

## 2025-04-16 RX ADMIN — MUPIROCIN 1 APPLICATION: 20 OINTMENT TOPICAL at 08:21

## 2025-04-16 RX ADMIN — Medication 10 ML: at 01:47

## 2025-04-16 RX ADMIN — BRINZOLAMIDE 1 DROP: 10 SUSPENSION/ DROPS OPHTHALMIC at 10:49

## 2025-04-16 RX ADMIN — ASPIRIN 81 MG: 81 TABLET, COATED ORAL at 08:21

## 2025-04-16 NOTE — PROGRESS NOTES
"[facility] Cardiology Daily Note       LOS: 2 days   Patient Care Team:  Sagar Mesa MD as PCP - General (Family Medicine)  Hudson Marley MD as Consulting Physician (Cardiology)  Em Hoyt APRN as Nurse Practitioner (Cardiology)  Niall Benjamni MD as Surgeon (Cardiothoracic Surgery)    Chief Complaint: Day 1 status post TAVR    Subjective     Subjective: Hearing aids in place this morning.  Much less confused.  Does complain of some itching at the site of her IV.  Magnesium is currently infusing.  94% on room air.  Blood pressures have been good overnight.  Heart rates in the 80s and 90s.  Hoping to go home today.    Review of Systems:   As above.    Medications:  bimatoprost, 1 drop, Both Eyes, Nightly  brinzolamide, 1 drop, Both Eyes, Q12H  aspirin, 81 mg, Oral, Daily  brimonidine-timolol, 1 drop, Both Eyes, Q12H  donepezil, 10 mg, Oral, Daily  magnesium sulfate, 4 g, Intravenous, Once  mirtazapine, 45 mg, Oral, Nightly  mupirocin, 1 Application, Each Nare, BID  senna-docusate sodium, 2 tablet, Oral, Nightly  sodium chloride, 10 mL, Intravenous, Q12H  vancomycin, 15 mg/kg, Intravenous, Once        Objective     Vital Sign Min/Max for last 24 hours  Temp  Min: 97.3 °F (36.3 °C)  Max: 99.1 °F (37.3 °C)   BP  Min: 119/85  Max: 177/81   Pulse  Min: 72  Max: 101   Resp  Min: 10  Max: 24   SpO2  Min: 91 %  Max: 100 %   Flow (L/min) (Oxygen Therapy)  Min: 1  Max: 3   Weight  Min: 53.8 kg (118 lb 9.7 oz)  Max: 53.8 kg (118 lb 9.7 oz)      Intake/Output Summary (Last 24 hours) at 4/16/2025 0659  Last data filed at 4/16/2025 0600  Gross per 24 hour   Intake --   Output 2280 ml   Net -2280 ml        Flowsheet Rows      Flowsheet Row First Filed Value   Admission Height 152.4 cm (60\") Documented at 04/15/2025 0623   Admission Weight 53.8 kg (118 lb 9.7 oz) Documented at 04/15/2025 0623            Physical Exam:    General: Alert and awake  Cardiovascular: Heart has a nondisplaced focal PMI. " "Regular rate and rhythm with 3/6 SE murmur, no gallop or rub.  Lungs: Clear without rales or wheezes. Equal expansion is noted.   Abdomen: Soft, nontender.  Extremities: Show no edema. No hematoma or bruit over either groin.  Skin: warm and dry.     Results Review:    I reviewed the patient's new clinical results.  EKG:  Tele: Sinus rhythm right bundle branch block first-degree AV block.  Right bundle branch block has now replaced left bundle branch block.    Labs:    Results from last 7 days   Lab Units 04/16/25  0324 04/15/25  0402 04/14/25  1212 04/11/25  1340   SODIUM mmol/L 135* 134* 133* 134*   POTASSIUM mmol/L 4.0 4.4 4.0 4.5   CHLORIDE mmol/L 100 101 102 98   CO2 mmol/L 23.0 23.0 24.0 26.0   BUN mg/dL 12 20 19 22   CREATININE mg/dL 0.53* 0.73 0.57 0.68   CALCIUM mg/dL 9.2 9.1 8.2* 9.6   BILIRUBIN mg/dL  --   --   --  0.3   ALK PHOS U/L  --   --   --  89   ALT (SGPT) U/L  --   --   --  10   AST (SGOT) U/L  --   --   --  18   GLUCOSE mg/dL 141* 115* 121* 178*     Results from last 7 days   Lab Units 04/16/25  0324 04/15/25  0402 04/14/25  1212   WBC 10*3/mm3 13.36* 11.30* 4.75   HEMOGLOBIN g/dL 11.8* 11.2* 11.2*   HEMATOCRIT % 36.2 33.6* 34.8   PLATELETS 10*3/mm3 151 176 174     No results found for: \"TROPONINI\", \"TROPONINT\"  Lab Results   Component Value Date    CHOL 118 02/11/2025     Lab Results   Component Value Date    TRIG 39 02/11/2025     Lab Results   Component Value Date    HDL 49 02/11/2025     No components found for: \"LDLCALC\"  Lab Results   Component Value Date    INR 1.11 04/11/2025    PROTIME 15.0 04/11/2025         Ejection Fraction:    Assessment   Assessment:    Severe aortic stenosis status post 20 mm CATHERINE 3 pericardial prosthesis on 4/14/2025  RBBB, first-degree AV block  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  If ambulates well will discharge " home.  Appointment with Dr. Marley 6 weeks    Shyanne Macdonald MD  04/16/25  06:59 EDT

## 2025-04-16 NOTE — PLAN OF CARE
Goal Outcome Evaluation:  Plan of Care Reviewed With: patient           Outcome Evaluation: PT initial evaluation completed for pt s/p TAVR presenting with generalized weakness, mild balance deficits, and decreased functional mobility. Pt ambulated 400ft and navigated 4 steps with Sam. Pt's decreased independence warrants PT skilled care and D/C home with assistance and  PT services.    Anticipated Discharge Disposition (PT): home with assist, home with home health

## 2025-04-16 NOTE — PROGRESS NOTES
Pt's daughter called to ask if you can help facilitate getting her a cardiologist in the area they are moving to     Pt is still in hospital and everyone she has asked referred her to you  Number to reach the daughter back is   [phone]       Spoke with daughter about follow up apts. Leaving for [place] by 5/11. They have apt with cardiology apt in [place] on 5/14. Message sent to scheduling for follow up.

## 2025-04-16 NOTE — PROGRESS NOTES
Intensive Care Follow-up     Hospital:  LOS: 2 days   Ms. Anali Hensley, 89 y.o. female is followed for:   Aortic stenosis, severe   Postoperative hemodynamic, electrolyte and respiratory management         History of present illness:   89 year-old female with aortic stenosis, bronchiectasis, anxiety, depression, and HTN that presents to [facility] ICU postoperatively from scheduled TAVR today with Dr. Benjamin.     Patient has been experiencing symptoms of lower extremity swelling and mild worsening of fatigue. With the above symptoms and severe aortic stenosis, patient referred for TAVR evaluation and underwent work-up. GABE showed severe AS. LHC with non-flow limiting CAD. Carotid duplex with 50% stenosis bilaterally. TAVR CTA noted widely patent vasculature, bilateral bronchiectasis and near complete middle lobe atelectasis and collapse suggestive of acute on chronic atypical infection such as STEVEN. PFTs not available.    Due to the above lung findings on imaging, she was referred to Dr. Morrissey in Pulmonary Office for evaluation. Her symptoms reported were baseline, thus, he felt nothing to add from a pulmonary standpoint.      Patient underwent transcatheter aortic valve replacement.  Course was complicated by complete heart block requiring temporary transvenous pacemaker.  Subsequently AV leadless pacemaker was placed on 4/15.    Subjective   Interval History:  Overnight did well.  Much more awake and alert today morning.  Wanting to go home.  Pacemaker was placed and intermittently paced rhythms.  Denies any worsening shortness of breath or any chest discomfort.  Not requiring any infusions currently.               The patient's past medical, surgical and social history were reviewed and updated in Epic as appropriate.       Objective     Infusions:  niCARdipine, 5-15 mg/hr, Last Rate: Stopped (04/16/25 0315)  phenylephrine, 0.5-3 mcg/kg/min      Medications:  bimatoprost, 1 drop, Both Eyes,  "Nightly  brinzolamide, 1 drop, Both Eyes, Q12H  aspirin, 81 mg, Oral, Daily  brimonidine-timolol, 1 drop, Both Eyes, Q12H  donepezil, 10 mg, Oral, Daily  magnesium sulfate, 4 g, Intravenous, Once  mirtazapine, 45 mg, Oral, Nightly  mupirocin, 1 Application, Each Nare, BID  senna-docusate sodium, 2 tablet, Oral, Nightly  sodium chloride, 10 mL, Intravenous, Q12H  vancomycin, 15 mg/kg, Intravenous, Once        Vital Sign Min/Max for last 24 hours  Temp  Min: 97.3 °F (36.3 °C)  Max: 99.1 °F (37.3 °C)   BP  Min: 119/85  Max: 177/81   Pulse  Min: 72  Max: 101   Resp  Min: 10  Max: 24   SpO2  Min: 91 %  Max: 100 %   Flow (L/min) (Oxygen Therapy)  Min: 1  Max: 3       Input/Output for last 24 hour shift  04/15 0701 - 04/16 0700  In: -   Out: 2280 [Urine:2280]      Objective:  Vital signs: (most recent): Blood pressure 119/85, pulse 91, temperature 97.3 °F (36.3 °C), temperature source Bladder, resp. rate 20, height 152.4 cm (60\"), weight 53.8 kg (118 lb 9.7 oz), SpO2 94%.            General Appearance:  Awake, alert, in no acute distress  Lungs:   B/L Breath sounds present with decreased breath sounds on bases, no wheezing heard, no crackles.   Heart: S1 and S2 present, 3/6 systolic murmur aortic area  Abdomen: Soft, nontender, no guarding or rigidity, bowel sounds positive.  Extremities:  no edema, warm to touch.Arterial and venous sheaths in place.   Neurologic:  Moving all four extremities.  No obvious focal deficits.    Results from last 7 days   Lab Units 04/16/25  0324 04/15/25  0402 04/14/25  1212   WBC 10*3/mm3 13.36* 11.30* 4.75   HEMOGLOBIN g/dL 11.8* 11.2* 11.2*   PLATELETS 10*3/mm3 151 176 174     Results from last 7 days   Lab Units 04/16/25  0324 04/15/25  0402 04/14/25  1212 04/11/25  1340   SODIUM mmol/L 135* 134* 133* 134*   POTASSIUM mmol/L 4.0 4.4 4.0 4.5   CO2 mmol/L 23.0 23.0 24.0 26.0   BUN mg/dL 12 20 19 22   CREATININE mg/dL 0.53* 0.73 0.57 0.68   MAGNESIUM mg/dL 1.9 1.8  --  1.9   GLUCOSE mg/dL " "141* 115* 121* 178*     Estimated Creatinine Clearance: 63.5 mL/min (A) (by C-G formula based on SCr of 0.53 mg/dL (L)).          Images:   Chest x-ray reviewed and showed mild right middle lobe infiltrative process versus atelectasis.  Leadless pacemaker in place.    I reviewed the patient's results and images.     Assessment & Plan   Impression        Aortic stenosis, severe s/p TAVR 4/14/25    Depression    Hypertension    GERD (gastroesophageal reflux disease)    Bronchiectasis    Abnormal chest CT (Bronchiectasis, partila RML collapse, \"Tree-in-bud\" opacities)    Severe aortic stenosis    Severe aortic valve stenosis    CHB (complete heart block)       Plan        1.  Patient is post TAVR.  CT surgery and cardiology team following closely.  Course is complicated by complete heart block requiring intermittent temporary transvenous pacemaker placement.  Patient underwent leadless pacemaker placement yesterday.  Tolerated procedure well.  No other hemodynamic instability or any other new cardiac issues.  Echocardiogram reveals severe mitral stenosis with systolic anterior movement of mitral valve.  There is aortic valve gradient as well.  Continue further management per cardiology team.  2.  Neurologically patient is motor intact.  No obvious focal deficit.  Continue to monitor closely.  Continue home medications.  3.  Hyponatremia improving.  Renal function remained stable.  Other electrolytes are stable as well.  4.  Patient has slight worsening leukocytosis.  Likely postop stress response. Perioperative antibiotics given.  Patient denies any cough or sputum production.  Denies any urinary complaints.  No fever.  Monitor for now.  5.   Continue patient's home medication for dementia and depression started including donezepil and mirtazapine.    Discharge disposition per primary team.    Plan of care and goals reviewed with multidisciplinary/antibiotic stewardship team during rounds.   I discussed the patient's " findings and my recommendations with nursing staff   Above documentation reviewed and reflect accurate information as of 4/16/2025 including copied elements of the note.     Johnson Burden MD, FCCP  Pulmonary, Critical care and Sleep Medicine

## 2025-04-16 NOTE — CASE MANAGEMENT/SOCIAL WORK
Case Management Discharge Note      Final Note: Plan is home with daughter. Daughter will transport. No discharge needs identified.         Selected Continued Care - Admitted Since 4/14/2025       Destination    No services have been selected for the patient.                Durable Medical Equipment    No services have been selected for the patient.                Dialysis/Infusion    No services have been selected for the patient.                Home Medical Care    No services have been selected for the patient.                Therapy    No services have been selected for the patient.                Community Resources    No services have been selected for the patient.                Community & DME    No services have been selected for the patient.                         Final Discharge Disposition Code: 01 - home or self-care

## 2025-04-16 NOTE — THERAPY EVALUATION
"Patient Name: Anali Hensley  : 1935    MRN: [id number]                              Today's Date: 2025       Admit Date: 2025    Visit Dx:     ICD-10-CM ICD-9-CM   1. CHB (complete heart block)  I44.2 426.0   2. 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 In comparison to our film from the office in  which was compared to ; taking into account differences in technique the really does not look like there is much of a change in the prominent interstitial markings that are present. No effusions as above. No consolidations.     H/O chest x-ray 10/28/2010    [place] silhouute is 12/24 and at upper limits of normal size. Several calcified nodules consistent with old granulomatous disease. A prominent \"dirty\" interstitial pattern but no consolidation of air bronchograms, In comparison to our prior films from 10/14/09 there is probably no significant interval change    H/O chest x-ray 10/14/2009    Good qaulity. CT ratio is 11/25 cm. Increased interstitial markings on the bases. Old granulomatou disease is present. No pleural effusions. No pneumothorax. And overall, no significant changes compared to prior films from 08    H/O CT scan of chest     showing a patchy infiltrate in the " Pt wants to know if the nurse sent her transportation forms to [facility]? Please advise.   right middle lobe which resembled a masslike consolidation.Improved after abx, with repeat CT in [place] neg/ followed in masslike consolidation.Improved after abx, with repeat CT in [place] neg/ followed inmasslike consolidation.Improved after abx, with repeat CT in [place] neg/ followed [id number] by her PCP; she was to be referred back for bronch if persis.    History of bronchoscopy     Reported negative bronchoscopy in 1993, then referred to Dr. Asher 2005 who  repeated a CAT scan due to R sided infiltrate with bronchiectasis exacerbation.    History of diabetes mellitus     History of PFTs 02/16/2016    Pulmonary function tests reveal no obstruction, restrictive pattern as before. FEV1 similar to prior at 1.26, 66%. Minute ventilation reduced.    History of PFTs 06/09/2015    no obstruction based on normal FEV/FVC ration though FEC dec sl from last. FVC reduced sugg a restrictive pattern    History of PFTs 10/22/2013    Ration normal. No obstruction. no restriction. MVV normal. no response BD rX    History of transfusion 1967    reaction - patient was given the wrong blood product type    Hx of chest x-ray 09/25/2008    Ct ratio is 12.5/24.5 Costophrenic angles are clear. Increased interstitial markings over both bases. No acute changes. Old granulomatous disease present    Hypertension     Nonrheumatic mitral (valve) stenosis     Short-term memory loss     Vaginal wall prolapse     H/o     Past Surgical History:   Procedure Laterality Date    AORTIC VALVE REPAIR/REPLACEMENT N/A 4/14/2025    Procedure: TRANSCATHETER AORTIC VALVE REPLACEMENT;  Surgeon: Niall Benjamin MD;  Location:  [email or web address];  Service: Cardiothoracic;  Laterality: N/A;  FL- 8 MIN 12 SEC  CONTRAST-  45 ML ISOVUE  DOSE- 111 MGY    AORTIC VALVE REPAIR/REPLACEMENT N/A 4/14/2025    Procedure: Transfemoral Transcatheter Aortic Valve Replacement;  Surgeon: Shyanne Macdonald MD;  Location:  [email or web address];  Service: Cardiovascular;   Laterality: N/A;    BLADDER REPAIR      CARDIAC CATHETERIZATION N/A 02/11/2025    Procedure: Left Heart Cath;  Surgeon: Hudson Marley MD;  Location:  [facility];  Service: Cardiovascular;  Laterality: N/A;    HYSTERECTOMY      PACEMAKER IMPLANTATION N/A 4/15/2025    Procedure: Implant or Replacement of Single Chamber Leadless Pacemaker, RV Only;  Surgeon: Niall Poole MD;  Location:  [facility];  Service: Cardiovascular;  Laterality: N/A;  Micra leadless pacemaker    TRANSESOPHAGEAL ECHOCARDIOGRAM (GABE) N/A 4/14/2025    Procedure: TRANSESOPHAGEAL ECHOCARDIOGRAM WITH ANESTHESIA;  Surgeon: Niall Benjamin MD;  Location:  [facility];  Service: Cardiothoracic;  Laterality: N/A;      General Information       Row Name 04/16/25 1413          Physical Therapy Time and Intention    Document Type evaluation  -KG     Mode of Treatment physical therapy  -KG       Row Name 04/16/25 1413          General Information    Patient Profile Reviewed yes  -KG     Prior Level of Function independent:;all household mobility;gait;transfer;min assist:;ADL's;dressing;bathing  -KG     Existing Precautions/Restrictions cardiac;fall  -KG     Barriers to Rehab medically complex;previous functional deficit;cognitive status;hearing deficit  -KG       Row Name 04/16/25 1413          Living Environment    Current Living Arrangements home  -KG     People in Home child(ge), adult  -KG       Row Name 04/16/25 1413          Home Main Entrance    Number of Stairs, Main Entrance four  -KG     Stair Railings, Main Entrance railings on both sides of stairs  -KG       Row Name 04/16/25 1413          Stairs Within Home, Primary    Number of Stairs, Within Home, Primary none  -KG       Row Name 04/16/25 1413          Cognition    Orientation Status (Cognition) oriented to;person;place  -KG       Row Name 04/16/25 1413          Safety Issues/Impairments Affecting Functional Mobility    Safety Issues Affecting Function  (Mobility) awareness of need for assistance;insight into deficits/self-awareness;safety precaution awareness;safety precautions follow-through/compliance  -KG     Impairments Affecting Function (Mobility) balance;cognition;coordination;endurance/activity tolerance;postural/trunk control;strength  -KG     Cognitive Impairments, Mobility Safety/Performance awareness, need for assistance;insight into deficits/self-awareness;safety precaution awareness;safety precaution follow-through;sequencing abilities  -KG               User Key  (r) = Recorded By, (t) = Taken By, (c) = Cosigned By      Initials Name Provider Type    KG Sallie Castillo, PT Physical Therapist                   Mobility       Row Name 04/16/25 1415          Bed Mobility    Comment, (Bed Mobility) UIC  -KG       Row Name 04/16/25 1415          Transfers    Comment, (Transfers) VC's for sequencing and safety awareness.  -KG       Row Name 04/16/25 1415          Sit-Stand Transfer    Sit-Stand [place] (Transfers) minimum assist (75% patient effort);verbal cues  -KG       Row Name 04/16/25 1415          Gait/Stairs (Locomotion)    [place] Level (Gait) minimum assist (75% patient effort);1 person assist;1 person to manage equipment;verbal cues  chair follow  -KG     Assistive Device (Gait) other (see comments)  B UE support on tripod monitor  -KG     Distance in Feet (Gait) 400  -KG     Deviations/Abnormal Patterns (Gait) bilateral deviations;base of support, narrow;sarbjit decreased;festinating/shuffling;stride length decreased  -KG     Bilateral Gait Deviations forward flexed posture  -KG     [place] Level (Stairs) minimum assist (75% patient effort)  -KG     Handrail Location (Stairs) left side (ascending);left side (descending)  HHA on opposite side  -KG     Ascending Technique (Stairs) step-to-step  -KG     Descending Technique (Stairs) step-to-step  -KG     Stairs, Safety Issues balance decreased during turns;sequencing ability  decreased  -KG     Comment, (Gait/Stairs) Pt ambulated 400ft with Sam and B UE support on tripod monitor. VC's for upright posture with forward gaze. Pt demonstrated adequate balance and stability. Pt navigated 4 steps with step to step pattern and one handrail; HHA on opposite side. Pt with mild balance and coordination deficits. VC's required for sequencing of steps.  -KG               User Key  (r) = Recorded By, (t) = Taken By, (c) = Cosigned By      Initials Name Provider Type    KG Sallie Castillo PT Physical Therapist                   Obj/Interventions       Row Name 04/16/25 1423          Range of Motion Comprehensive    General Range of Motion no range of motion deficits identified  -KG     Comment, General Range of Motion B LE WFL  -KG       Row Name 04/16/25 1423          Strength Comprehensive (MMT)    Comment, General Manual Muscle Testing (MMT) Assessment B LE grossly 3+/5  -KG       Row Name 04/16/25 1423          Balance    Balance Assessment sitting static balance;standing static balance;standing dynamic balance  -KG     Static Sitting Balance standby assist  -KG     Position, Sitting Balance unsupported;sitting in chair  -KG     Static Standing Balance contact guard  -KG     Dynamic Standing Balance minimal assist  -KG     Position/Device Used, Standing Balance supported  -KG       Row Name 04/16/25 1423          Sensory Assessment (Somatosensory)    Sensory Assessment (Somatosensory) LE sensation intact  -KG               User Key  (r) = Recorded By, (t) = Taken By, (c) = Cosigned By      Initials Name Provider Type    KG Sallie Castillo PT Physical Therapist                   Goals/Plan       Row Name 04/16/25 1430          Bed Mobility Goal 1 (PT)    Activity/Assistive Device (Bed Mobility Goal 1, PT) sit to supine;supine to sit  -KG     [place] Level/Cues Needed (Bed Mobility Goal 1, PT) independent  -KG     Time Frame (Bed Mobility Goal 1, PT) short term goal (STG);3 days   -KG     Progress/Outcomes (Bed Mobility Goal 1, PT) goal ongoing  -KG       Row Name 04/16/25 1430          Transfer Goal 1 (PT)    Activity/Assistive Device (Transfer Goal 1, PT) sit-to-stand/stand-to-sit;bed-to-chair/chair-to-bed;walker, rolling  -KG     [place] Level/Cues Needed (Transfer Goal 1, PT) modified independence  -KG     Time Frame (Transfer Goal 1, PT) long term goal (LTG);1 week  -KG     Progress/Outcome (Transfer Goal 1, PT) goal ongoing  -KG       Row Name 04/16/25 1430          Gait Training Goal 1 (PT)    Activity/Assistive Device (Gait Training Goal 1, PT) gait (walking locomotion);assistive device use;walker, rolling  -KG     [place] Level (Gait Training Goal 1, PT) modified independence  -KG     Distance (Gait Training Goal 1, PT) 400 feet  -KG     Time Frame (Gait Training Goal 1, PT) long term goal (LTG);1 week  -KG     Progress/Outcome (Gait Training Goal 1, PT) goal ongoing  -KG       Row Name 04/16/25 1430          Stairs Goal 1 (PT)    Activity/Assistive Device (Stairs Goal 1, PT) ascending stairs;descending stairs;using handrail, left;using handrail, right;step-to-step  -KG     [place] Level/Cues Needed (Stairs Goal 1, PT) contact guard required  -KG     Number of Stairs (Stairs Goal 1, PT) 4  -KG     Time Frame (Stairs Goal 1, PT) long term goal (LTG);1 week  -KG     Progress/Outcome (Stairs Goal 1, PT) goal ongoing  -KG       Row Name 04/16/25 1430          Therapy Assessment/Plan (PT)    Planned Therapy Interventions (PT) balance training;bed mobility training;gait training;stair training;strengthening;transfer training  -KG               User Key  (r) = Recorded By, (t) = Taken By, (c) = Cosigned By      Initials Name Provider Type    Sallie Mccray, PT Physical Therapist                   Clinical Impression       Row Name 04/16/25 1423          Pain    Pretreatment Pain Rating 0/10 - no pain  -KG     Posttreatment Pain Rating 0/10 - no pain  -KG       Row  Name 04/16/25 1423          Plan of Care Review    Plan of Care Reviewed With patient  -KG     Outcome Evaluation PT initial evaluation completed for pt s/p TAVR presenting with generalized weakness, mild balance deficits, and decreased functional mobility. Pt ambulated 400ft and navigated 4 steps with Sam. Pt's decreased independence warrants PT skilled care and D/C home with assistance and  PT services.  -KG       Row Name 04/16/25 1423          Therapy Assessment/Plan (PT)    Patient/Family Therapy Goals Statement (PT) return to PLOF  -KG     Rehab Potential (PT) good  -KG     Criteria for Skilled Interventions Met (PT) yes;skilled treatment is necessary  -KG     Therapy Frequency (PT) daily  -KG     Predicted Duration of Therapy Intervention (PT) 7 days  -KG       Row Name 04/16/25 1423          Vital Signs    Pre Systolic BP Rehab 100  -KG     Pre Treatment Diastolic BP 58  -KG     Post Systolic BP Rehab 120  -KG     Post Treatment Diastolic BP 62  -KG     Pretreatment Heart Rate (beats/min) 82  -KG     Posttreatment Heart Rate (beats/min) 74  -KG     Pre SpO2 (%) 95  -KG     O2 Delivery Pre Treatment room air  -KG     Post SpO2 (%) 94  -KG     O2 Delivery Post Treatment room air  -KG     Pre Patient Position Sitting  -KG     Intra Patient Position Standing  -KG     Post Patient Position Sitting  -KG       Row Name 04/16/25 1423          Positioning and Restraints    Pre-Treatment Position sitting in chair/recliner  -KG     Post Treatment Position chair  -KG     In Chair notified nsg;reclined;call light within reach;encouraged to call for assist;exit alarm on;with family/caregiver;RUE elevated;LUE elevated;legs elevated  -KG               User Key  (r) = Recorded By, (t) = Taken By, (c) = Cosigned By      Initials Name Provider Type    KG Sallie Castillo, PT Physical Therapist                   Outcome Measures       Row Name 04/16/25 1439          How much help from another person do you currently  need...    Turning from your back to your side while in flat bed without using bedrails? 3  -KG     Moving from lying on back to sitting on the side of a flat bed without bedrails? 3  -KG     Moving to and from a bed to a chair (including a wheelchair)? 3  -KG     Standing up from a chair using your arms (e.g., wheelchair, bedside chair)? 3  -KG     Climbing 3-5 steps with a railing? 3  -KG     To walk in hospital room? 3  -KG     AM-PAC 6 Clicks Score (PT) 18  -KG     Highest Level of Mobility Goal 6 --> Walk 10 steps or more  -KG       Row Name 04/16/25 1435          Functional Assessment    Outcome Measure Options AM-PAC 6 Clicks Basic Mobility (PT)  -KG               User Key  (r) = Recorded By, (t) = Taken By, (c) = Cosigned By      Initials Name Provider Type    KG Sallie Castillo, PT Physical Therapist                                 Physical Therapy Education       Title: PT OT SLP Therapies (In Progress)       Topic: Physical Therapy (In Progress)       Point: Mobility training (In Progress)       Learning Progress Summary            Patient Acceptance, E, NR by KG at 4/16/2025 1027                      Point: Home exercise program (Not Started)       Learner Progress:  Not documented in this visit.              Point: Body mechanics (In Progress)       Learning Progress Summary            Patient Acceptance, E, NR by KG at 4/16/2025 1027                      Point: Precautions (In Progress)       Learning Progress Summary            Patient Acceptance, E, NR by KG at 4/16/2025 1027                                      User Key       Initials Effective Dates Name Provider Type Discipline    KG 05/22/20 -  Sallie Castillo, PT Physical Therapist PT                  PT Recommendation and Plan  Planned Therapy Interventions (PT): balance training, bed mobility training, gait training, stair training, strengthening, transfer training  Outcome Evaluation: PT initial evaluation completed for pt s/p  TAVR presenting with generalized weakness, mild balance deficits, and decreased functional mobility. Pt ambulated 400ft and navigated 4 steps with Sam. Pt's decreased independence warrants PT skilled care and D/C home with assistance and  PT services.     Time Calculation:   PT Evaluation Complexity  History, PT Evaluation Complexity: 3 or more personal factors and/or comorbidities  Examination of Body Systems (PT Eval Complexity): total of 3 or more elements  Clinical Presentation (PT Evaluation Complexity): evolving  Clinical Decision Making (PT Evaluation Complexity): moderate complexity  Overall Complexity (PT Evaluation Complexity): moderate complexity     PT Charges       Row Name 04/16/25 1027             Time Calculation    Start Time 1027  -KG      PT Received On 04/16/25  -KG      PT Goal Re-Cert Due Date 04/26/25  -KG         Time Calculation- PT    Total Timed Code Minutes- PT 11 minute(s)  -KG         Timed Charges    73674 - PT Therapeutic Activity Minutes 11  -KG         Untimed Charges    PT Eval/Re-eval Minutes 46  -KG         Total Minutes    Timed Charges Total Minutes 11  -KG      Untimed Charges Total Minutes 46  -KG       Total Minutes 57  -KG                User Key  (r) = Recorded By, (t) = Taken By, (c) = Cosigned By      Initials Name Provider Type    KG Sallie Castillo, PT Physical Therapist                  Therapy Charges for Today       Code Description Service Date Service Provider Modifiers Qty    63204333913 HC PT THERAPEUTIC ACT EA 15 MIN 4/16/2025 Sallie Castillo, PT GP 1    79826347683 HC PT EVAL MOD COMPLEXITY 4 4/16/2025 Sallie Castillo, PT GP 1            PT G-Codes  Outcome Measure Options: AM-PAC 6 Clicks Basic Mobility (PT)  AM-PAC 6 Clicks Score (PT): 18  PT Discharge Summary  Anticipated Discharge Disposition (PT): home with assist, home with home health    Abby Castillo PT  4/16/2025

## 2025-04-16 NOTE — PROGRESS NOTES
[facility] at [facility]  PROGRESS NOTE    Anali Hensley   [id number]   1935    LOS: 2 days .  Date of Admission: 4/14/2025  Date of Service: 04/16/25    Primary Care Physician: Sagar Mesa MD    Chief Complaint: CHB s/p TAVR      Subjective      Sitting up in the chair eating breakfast.  Device functioning appropriately.  Figure 8 suture removed from groin site with no difficulty.  No hematoma at site.    ROS  All systems have been reviewed and are negative with the exception of those mentioned in the HPI and problem list above.     Objective   Vital Sign Min/Max for last 24 hours  Temp  Min: 97.3 °F (36.3 °C)  Max: 99.1 °F (37.3 °C)   BP  Min: 119/85  Max: 177/81   Pulse  Min: 72  Max: 101   Resp  Min: 10  Max: 24   SpO2  Min: 91 %  Max: 100 %   No data recorded   Weight  Min: 53.8 kg (118 lb 9.7 oz)  Max: 53.8 kg (118 lb 9.7 oz)     Physical Exam:  GENERAL: Alert, cooperative, in no acute distress.   HEENT: Normocephalic, no jugular venous distention  HEART: Regular rhythm, normal rate, and no murmurs, gallops, or rubs.   LUNGS: Clear to auscultation bilaterally. No wheezing, rales or rhonchi. RA  NEUROLOGIC: No focal abnormalities involving strength or sensation are noted.   EXTREMITIES: No clubbing, cyanosis, or edema noted.     Results:  Results from last 7 days   Lab Units 04/16/25  0324 04/15/25  0402 04/14/25  1212   WBC 10*3/mm3 13.36* 11.30* 4.75   HEMOGLOBIN g/dL 11.8* 11.2* 11.2*   HEMATOCRIT % 36.2 33.6* 34.8   PLATELETS 10*3/mm3 151 176 174     Results from last 7 days   Lab Units 04/16/25  0324 04/15/25  0402 04/14/25  1212   SODIUM mmol/L 135* 134* 133*   POTASSIUM mmol/L 4.0 4.4 4.0   CHLORIDE mmol/L 100 101 102   CO2 mmol/L 23.0 23.0 24.0   BUN mg/dL 12 20 19   CREATININE mg/dL 0.53* 0.73 0.57   GLUCOSE mg/dL 141* 115* 121*      Lab Results   Component Value Date    CHOL 118 02/11/2025    TRIG 39 02/11/2025    HDL 49 02/11/2025    LDL 59 02/11/2025    AST  18 04/11/2025    ALT 10 04/11/2025     Results from last 7 days   Lab Units 04/11/25  1340   HEMOGLOBIN A1C % 5.90*                 Results from last 7 days   Lab Units 04/14/25  1212 04/11/25  1340   PROTIME Seconds  --  15.0   INR   --  1.11   APTT seconds 33.5 30.1               Intake/Output Summary (Last 24 hours) at 4/16/2025 0735  Last data filed at 4/16/2025 0600  Gross per 24 hour   Intake --   Output 2280 ml   Net -2280 ml       EKG/TELE: SR    Radiology Data:   XR Chest PA & Lateral  Result Date: 4/16/2025  1.Mild right middle lobe infiltrate or atelectasis. 2.Status post TAVR with leadless pacer. Electronically Signed: James Killian MD  4/16/2025 5:06 AM EDT  Workstation ID: [id number]     Results for orders placed during the hospital encounter of 04/14/25    Adult Transthoracic Echo Limited W/ Cont if Necessary Per Protocol    Interpretation Summary    Left ventricular systolic function is hyperdynamic (EF > 70%). Estimated left ventricular EF = 75-80%    Left ventricular wall thickness is consistent with borderline concentric hypertrophy.    There is a 20 mm CATHERINE 3 TAVR valve present.    Aortic valve area is 1.5 cm2.    Aortic valve mean pressure gradient is 44 mmHg.    Systolic anterior motion of the anterior mitral leaflet is present. A gradient is present at rest precluding accurate measurement of the aortic valve gradient.    Severe mitral valve stenosis is present. The mitral valve mean gradient is 13 mmHg at a heart rate of 82 bpm.    Mild to moderate mitral regurgitation is present.     Current Medications:  bimatoprost, 1 drop, Both Eyes, Nightly  brinzolamide, 1 drop, Both Eyes, Q12H  aspirin, 81 mg, Oral, Daily  brimonidine-timolol, 1 drop, Both Eyes, Q12H  donepezil, 10 mg, Oral, Daily  magnesium sulfate, 4 g, Intravenous, Once  mirtazapine, 45 mg, Oral, Nightly  mupirocin, 1 Application, Each Nare, BID  senna-docusate sodium, 2 tablet, Oral, Nightly  sodium chloride, 10 mL, Intravenous,  Q12H  vancomycin, 15 mg/kg, Intravenous, Once      niCARdipine, 5-15 mg/hr, Last Rate: Stopped (04/16/25 0315)  phenylephrine, 0.5-3 mcg/kg/min      Assessment and Plan:   CHB s/p TAVR  Prior EKG shows first degree and RBBB.  Currently SR with PVCs   Aortic stenosis  S/p TAVR today with Dr. Macdonald and Dr. Benjamin.     -s/p micra leadless pacemaker, 4/16/25 by Dr. Poole  -Device functioning appropriately.   -Chest xray WNL from a device standpoint.   -Follow up with Dr. Poole in 3 months with MDT device check.  Patient may follow-up in [place].  Daughter is working on getting cardiology and EP cardiology appointments.  -EP Cardiology will sign off at this time, please call for questions or concerns.     Electronically signed by LC Klein, 04/16/25, 7:35 AM EDT.     Please note that portions of this note were dictated utilizing Dragon dictation.

## 2025-04-16 NOTE — TELEPHONE ENCOUNTER
Pt's daughter called to ask if you can help facilitate getting her a cardiologist in the area they are moving to    Pt is still in hospital and everyone she has asked referred her to you  Number to reach the daughter back is   [phone]

## 2025-04-16 NOTE — PROGRESS NOTES
"CTS Progress Note      POD # 2 s/p TAVR     LOS: 2 days     Subjective  Up in chair, no complaints.  No sleep overnight per nursing    Objective    Vital Signs  Temp:  [97.3 °F (36.3 °C)-99.1 °F (37.3 °C)] 97.3 °F (36.3 °C)  Heart Rate:  [] 91  Resp:  [10-24] 20  BP: (119-177)/(61-87) 119/85  Arterial Line BP: (107-194)/(48-85) 122/53    Physical Exam:   General Appearance: alert, appears stated age and cooperative   Lungs: clear to auscultation     Heart: regular rhythm & normal rate, normal S1, S2 and no murmur, no gallop, no rub   Extremities: groins soft, no hematoma     Results     Results from last 7 days   Lab Units 04/16/25  0324   WBC 10*3/mm3 13.36*   HEMOGLOBIN g/dL 11.8*   HEMATOCRIT % 36.2   PLATELETS 10*3/mm3 151     Results from last 7 days   Lab Units 04/16/25  0324   SODIUM mmol/L 135*   POTASSIUM mmol/L 4.0   CHLORIDE mmol/L 100   CO2 mmol/L 23.0   BUN mg/dL 12   CREATININE mg/dL 0.53*   GLUCOSE mg/dL 141*   CALCIUM mg/dL 9.2       Imaging Results (Last 24 Hours)       Procedure Component Value Units Date/Time    XR Chest PA & Lateral [[id number]] Collected: 04/16/25 0505     Updated: 04/16/25 0509    Narrative:      XR CHEST PA AND LATERAL    Date of Exam: 4/16/2025 4:36 AM EDT    Indication: Post ICD / Pacer Implant    Comparison: 4/11/2025    Findings:  Cardiac and mediastinal contours are normal. Patient is status post TAVR, and there appears to be a leadless pacer. There is mild right middle lobe infiltrate or atelectasis. No pneumothorax.      Impression:      1.Mild right middle lobe infiltrate or atelectasis.  2.Status post TAVR with leadless pacer.        Electronically Signed: James Killian MD    4/16/2025 5:06 AM EDT    Workstation ID: [id number]            Assessment    Aortic stenosis, severe s/p TAVR 4/14/25    Depression    Hypertension    GERD (gastroesophageal reflux disease)    Bronchiectasis    Abnormal chest CT (Bronchiectasis, partila RML collapse, \"Tree-in-bud\" " opacities)    Severe aortic stenosis    Severe aortic valve stenosis    CHB (complete heart block)      Plan   Ambulate  Discharge home    Niall Benjamin MD  04/16/25  07:23 EDT

## 2025-04-17 ENCOUNTER — TRANSITIONAL CARE MANAGEMENT TELEPHONE ENCOUNTER (OUTPATIENT)
Dept: CALL CENTER | Facility: HOSPITAL | Age: 89
End: 2025-04-17
Payer: MEDICARE

## 2025-04-17 ENCOUNTER — NURSE TRIAGE (OUTPATIENT)
Dept: CALL CENTER | Facility: HOSPITAL | Age: 89
End: 2025-04-17
Payer: MEDICARE

## 2025-04-17 NOTE — TELEPHONE ENCOUNTER
"Aortic stenosis, severe s/p TAVR 4/14/25    B/p 90's /50's    B/p reading this afternoon  91-93/50-60  C/O weakness, dizziness and lightheadness.  Daughter says eating and drinking ok  Takes no b/p medications. B/p medications were stopped weeks ago.  Has checked Incisions in groins no bleeding      Advised to call surgeon if  unable to reach advised to go to ED      Reason for Disposition   [1] Fall in systolic BP > 20 mm Hg from normal AND [2] dizzy, lightheaded, or weak    Additional Information   Negative: Started suddenly after an allergic medicine, an allergic food, or bee sting   Negative: Shock suspected (e.g., cold/pale/clammy skin, too weak to stand, low BP, rapid pulse)   Negative: Difficult to awaken or acting confused (e.g., disoriented, slurred speech)   Negative: Fainted   Negative: [1] Systolic BP < 90 AND [2] dizzy, lightheaded, or weak   Negative: Chest pain   Negative: Bleeding (e.g., vomiting blood, rectal bleeding or tarry stools, severe vaginal bleeding)(Exception: Fainted from sight of small amount of blood; small cut or abrasion.)   Negative: Extra heartbeats, irregular heart beating, or heart is beating very fast  (i.e., \"palpitations\")   Negative: Sounds like a life-threatening emergency to the triager   Negative: [1] Systolic BP < 80 AND [2] NOT dizzy, lightheaded or weak   Negative: Abdominal pain   Negative: Fever > 100.4 F (38.0 C)   Negative: Major surgery in the past month   Negative: [1] Drinking very little AND [2] dehydration suspected (e.g., no urine > 12 hours, very dry mouth, very lightheaded)    Answer Assessment - Initial Assessment Questions  1. BLOOD PRESSURE: \"What is the blood pressure?\" \"Did you take at least two measurements 5 minutes apart?\"        Yes has taken b/p several times this afternoon  B/P 91-93/50-60  2. ONSET: \"When did you take your blood pressure?\"      B/P's taken today  3. HOW: \"How did you obtain the blood pressure?\" (e.g., visiting nurse, automatic " "home BP monitor)      Automatic cuff  4. HISTORY: \"Do you have a history of low blood pressure?\" \"What is your blood pressure normally?\"       130's  5. MEDICINES: \"Are you taking any medications for blood pressure?\" If Yes, ask: \"Have they been changed recently?\"      No b/p medications were stopped weeks ago  6. PULSE RATE: \"Do you know what your pulse rate is?\"       HR 69  7. OTHER SYMPTOMS: \"Have you been sick recently?\" \"Have you had a recent injury?\"   Recent TAVR  8. PREGNANCY: \"Is there any chance you are pregnant?\" \"When was your last menstrual period?\"   na    Protocols used: Blood Pressure - Low-ADULT-AH    "

## 2025-04-17 NOTE — OUTREACH NOTE
Call Center TCM Note      Flowsheet Row Responses   [facility] patient discharged from? [place]   Does the patient have one of the following disease processes/diagnoses(primary or secondary)? Cardiothoracic surgery   TCM attempt successful? Yes   Call start time 1029   Call end time 1046   Is patient permission given to speak with other caregiver? Yes   Person spoke with today (if not patient) and relationship Aleida-daughter.   Meds reviewed with patient/caregiver? Yes   Is the patient having any side effects they believe may be caused by any medication additions or changes? No   Does the patient have all medications related to this admission filled (includes all antibiotics, pain medications, cardiac medications, etc.) N/A   Is the patient taking all medications as directed (includes completed medication regime)? Yes   Comments States PCP is [place] with [place] [facility] [place]has appt on 04/23/25. Dr Marley appt 04/21/25, CT surgeon appt 05/06/25.   Does the patient have an appointment with their PCP within 7-14 days of discharge? Yes   Has home health visited the patient within 72 hours of discharge? N/A   Psychosocial issues? No   Psychosocial comments Lives with her daughter, Aleida. Daughter states plans to take patient on extended trip to [place] after f/u with Dr Marley, and has cardiology appt scheduled there to continue f/u.   Did the patient receive a copy of their discharge instructions? Yes   Nursing interventions Reviewed instructions with patient   What is the patient's perception of their health status since discharge? Returned to baseline/stable   Nursing interventions Nurse provided patient education   Is the patient/caregiver able to teach back normal signs of recovery? Pain or discomfort at incisional site   Nursing interventions Reassured on normal signs of recovery   Is the patient /caregiver able to teach back basic post-op care? Hold pillow to support chest when coughing, Drive as instructed  by MD in discharge instructions, Shower daily, No tub bath, swimming, or hot tub until instructed by MD, Use a clean wash cloth and antibacterial bar or liquid soap to clean incisions, If the steri-strips are falling off, it is okay to remove them. (If applicable), Lifting as instructed by MD in discharge instructions, Practice cough and deep breath every 4 hours while awake   Is the patient/caregiver able to teach back signs and symptoms of incisional infection? Increased redness, swelling or pain at the incisonal site, Increased drainage or bleeding, Incisional warmth, Pus or odor from incision, Fever   Is the patient/caregiver able to teach back steps to recovery at home? Set small, achievable goals for return to baseline health, Rest and rebuild strength, gradually increase activity, Practice good oral hygiene, Eat a well-balance diet   Is the patient /caregiver able to teach back the importance of cardiac rehab? Yes   Nursing interventions Provided education on importance of cardiac rehab   If the patient is a current smoker, are they able to teach back resources for cessation? Not a smoker   Is the patient/caregiver able to teach back the hierarchy of who to call/visit for symptoms/problems? PCP, Specialist, Home health nurse, Urgent Care, ED, 911 Yes   Additional teach back comments Daughter states will be monitoring VS and keeping record for appts. [facility] [place] patient also weighs daily, and has lost around 3# since hospitalized.   TCM call completed? Yes   Wrap up additional comments Daughter states patient is stable. Denies any s/s of infection at bilateral groin sites. Denies any edema. States patient remains fatigued. States supplementing diet with high calorie Boost. [facility] [place] patient was slightly confused this morning upon awakening, but has resolved. [facility] [place] will monitor for any other s/s or return of confusion. [facility] [place] plans to take patient to [place] after f/u with Dr Marley, and will be f/u with  cardiology there on 05/14/25. States will be in [place] through August of 2026. Denies any needs today. States patient's PCP is not a [facility] practice. TCM resolved.   Call end time 1046   Would this patient benefit from a Referral to [facility] Social Work? No   Is the patient interested in additional calls from an ambulatory ? No            Wendie FELIZ - Registered Nurse    4/17/2025, 10:47 EDT

## 2025-04-17 NOTE — OUTREACH NOTE
Prep Survey      Flowsheet Row Responses   [facility] facility patient discharged from? [place]   Is LACE score < 7 ? No   Eligibility [facility]   Date of Admission 04/14/25   Date of Discharge 04/16/25   Discharge Disposition Home or Self Care   Discharge diagnosis Aortic stenosis s/p TAVR   Does the patient have one of the following disease processes/diagnoses(primary or secondary)? Cardiothoracic surgery   Does the patient have Home health ordered? No   Is there a DME ordered? No   Prep survey completed? Yes            ZAKI A - Registered Nurse

## 2025-04-17 NOTE — DISCHARGE SUMMARY
"Discharge Summary Transcatheter Valve Replacement    Date of Admission: 4/14/2025  Date of Discharge: 4/16/25    PCP: Sagar Mesa MD  ATTENDING: Niall Benjamin MD    Consults:   Consulting Physician(s)                     None                Structural Heart Team  1.  Niall Benjamin MD  2.  aJmes Gomez MD  3.  Wilbur Del Valle MD  4.  Shyanne Macdonald MD  5.  Terry Tam MD  8.  LC Perdue    Presenting Problem/ HPI:   Anali Hensley is a 89 y.o. female referred to Dr. Benjamin by Dr. Macdonald for consideration of TAVR. PMH: bronchiectasis, axiety, depression, dementia, and aortic stenosis. Patient has met with other members of the TAVR team and completed all testing. Due to her age and cormorbidities, the patient and her daughter are not interested in SAVR. Her daughter reports only mild worsening fatigue with activities such as showering. She denies any symptoms or shortness of breath or chest pain, although she has a very sedentary lifestyle. GABE demonstrates peak velocity of 466 cm/s, mean pressure gradient 57 mmHg and aortic valve area of 1cm2. Cardiac catheterization revealed non flow limiting CAD. Carotid duplex with <50% stenosis bilaterally. CTA TAVR imaging with widely patent vasculature.   Discharge Diagnosis:     Aortic stenosis, severe s/p TAVR 4/14/25    Depression    Hypertension    GERD (gastroesophageal reflux disease)    Bronchiectasis    Abnormal chest CT (Bronchiectasis, partila RML collapse, \"Tree-in-bud\" opacities)    Severe aortic stenosis    Severe aortic valve stenosis    CHB (complete heart block)      Procedures Performed:   Procedure(s):  Implant or Replacement of Single Chamber Leadless Pacemaker, RV Only    Hospital Course:The patient is an 89 y.o. female from with symptoms of severe aortic stenosis as noted above in the HPI.  An echocardiogram revealed the following:  Results for orders placed during the hospital encounter of 04/14/25    Adult " Transthoracic Echo Limited W/ Cont if Necessary Per Protocol    Interpretation Summary    Left ventricular systolic function is hyperdynamic (EF > 70%). Estimated left ventricular EF = 75-80%    Left ventricular wall thickness is consistent with borderline concentric hypertrophy.    There is a 20 mm MIRNA 3 TAVR valve present.    Aortic valve area is 1.5 cm2.    Aortic valve mean pressure gradient is 44 mmHg.    Systolic anterior motion of the anterior mitral leaflet is present. A gradient is present at rest precluding accurate measurement of the aortic valve gradient.    Severe mitral valve stenosis is present. The mitral valve mean gradient is 13 mmHg at a heart rate of 82 bpm.    Mild to moderate mitral regurgitation is present.      The patient was evaluated and deemed to be at greater risk of mortality with traditional open AVR primarily based upon physical exam, laboratory studies, and imaging findings.   The patient was admitted the morning of the scheduled OR procedure.    Procedure:   1. Percutaneous left common femoral arterial sheath placement  2. Aortogram  3. Balloon aortic valvuloplasty  4. Transcatheter aortic valve replacement (20 mm Mirna 3 Ultra tissue valve)  5. Completion aortogram    Physical Exam on date of discharge:   General Appearance: alert, appears stated age and cooperative              Lungs: clear to auscultation                Heart: regular rhythm & normal rate, normal S1, S2 and no murmur, no gallop, no rub              Extremities: groins soft, no hematoma    Pertinent Test Results:   Results from last 7 days   Lab Units 04/16/25  0324   WBC 10*3/mm3 13.36*   HEMOGLOBIN g/dL 11.8*   HEMATOCRIT % 36.2   PLATELETS 10*3/mm3 151     Results from last 7 days   Lab Units 04/16/25  0324   SODIUM mmol/L 135*   POTASSIUM mmol/L 4.0   CHLORIDE mmol/L 100   CO2 mmol/L 23.0   BUN mg/dL 12   CREATININE mg/dL 0.53*   GLUCOSE mg/dL 141*   CALCIUM mg/dL 9.2       Discharge Disposition  Home or  Self Care    Discharge Medications     Discharge Medications        Continue These Medications        Instructions Start Date   Aleve 220 MG tablet  Generic drug: naproxen sodium   Every 12 Hours Scheduled      ascorbic acid with antonio hips 1000 MG tablet   1,000 mg, Daily      aspirin 81 MG EC tablet   1 tablet, Daily      Azopt 1 % ophthalmic suspension  Generic drug: brinzolamide   1 drop, 2 Times Daily      Colace 100 MG capsule  Generic drug: docusate sodium   100 mg, Every 24 Hours      Combigan 0.2-0.5 % ophthalmic solution  Generic drug: brimonidine-timolol   Administer 1 drop to both eyes Every 12 (Twelve) Hours.      Diclofenac Sodium 1 % gel gel  Commonly known as: VOLTAREN   APPLY 2 GRAMS TO THE AFFECTED AREA(S) BY TOPICAL ROUTE 4 TIMES PER DAY      donepezil 10 MG tablet  Commonly known as: ARICEPT   10 mg, Daily      LORazepam 1 MG tablet  Commonly known as: ATIVAN   Take  by mouth.      Lumigan 0.01 % ophthalmic drops  Generic drug: bimatoprost   1 drop, Nightly      MIRALAX PO   0.5 Capfuls, Daily      mirtazapine 45 MG tablet  Commonly known as: REMERON   45 mg, Nightly      promethazine 12.5 MG tablet  Commonly known as: PHENERGAN   TAKE 1 OR 2 TABLETS EVERY 8 HOURS      spironolactone 25 MG tablet  Commonly known as: ALDACTONE   1 tablet, Daily      Tylenol 8 Hour 650 MG 8 hr tablet  Generic drug: acetaminophen   650 mg, Every 8 Hours Scheduled      zinc gluconate 50 MG tablet   0.5 tablets, Daily               Discharge Diet:     Activity at Discharge:   Activity Instructions       Driving Restrictions      Type of Restriction: Driving    Driving Restrictions: No Driving While Taking Narcotics    Lifting Restrictions      Type of Restriction: Lifting    Lifting Restrictions: Lifting Restriction (Indicate Limit)    Weight Limit (Pounds): 15    Length of Lifting Restriction: until next appointment            Special Instructions Following Valve Procedure   Check incision/groin sites daily. Clean  daily with a clean washcloth, soap and water. Pat dry. Do not scrub. Wear loose fitting clothing over groin incision site until healed.  Shower:  May shower daily, but no tub baths, hot tubs or pools until Cardiac (CT) Surgeon approves.   Walk daily starting with a 5 minute walk four times daily.  Increase walking by 1-2 minutes per walk as tolerated. No strenuous activity until CT Surgeon approves.   No lifting over 10 lbs for 7 days.  No driving for 7 days unless your physician tells you otherwise.   Heart valve infection: Tell your doctors/dentists that your heart valve has been replaced before any procedures or dental work. You will be given a special wallet card at discharge to show your doctor/dentist. The card provides recommendations on when antibiotics are needed and the dose.    Dental care: Reduce your risk of heart valve infection by brushing your teeth twice a day, using dental floss and seeing your dentist at least twice a year.   Monitor your heart rate, blood pressure and weight. Weigh yourself first thing in the morning wearing similar weight clothing. Report a weight gain of 3 pounds or more in a 24 hour period or 5 pounds in one week to your CT surgeon. Record your numbers and bring to your doctor appointments.  Report groin/incision site redness, drainage, swelling and/or significant tenderness, leg/feet swelling, chills and/or fever of 101 degrees at anytime or 100 for more than 24 hours, chest pain or increased shortness of breath to [facility] CT Surgery at [phone].   Call CT Surgery Office for all questions or concerns at [phone].     Follow-up Appointments  Future Appointments   Date Time Provider Department Center   4/21/2025 11:15 AM Hudson Marley MD [facility]   5/6/2025 12:30 PM Lizzette Gonzalez APRN [facility]   7/17/2025  9:45 AM Niall Poole MD [facility]     Additional Instructions for the Follow-ups that You Need to Schedule        Discharge Follow-up with Specialty: Follow up with Dr. Benjamin in office in 2 weeks   As directed      Specialty: Follow up with Dr. Benjamin in office in 2 weeks                Time spent for discharge: 30 minutes    Thank you for allowing the [facility] Structural Heart Team to participate in your care.    If you have any questions or concerns please call:     Cardiothoracic Surgery   Dr. Benjamin/Jason at [phone]    Cardiology  Dr. Macdonald/Eligio at [phone]    Dr. Tam at [phone]    April Rivera PA-C  04/17/25  11:01 EDT

## 2025-04-18 PROBLEM — I34.0 MITRAL VALVE INSUFFICIENCY: Status: ACTIVE | Noted: 2025-04-18

## 2025-04-18 LAB
BH BB BLOOD EXPIRATION DATE: NORMAL
BH BB BLOOD EXPIRATION DATE: NORMAL
BH BB BLOOD TYPE BARCODE: 5100
BH BB BLOOD TYPE BARCODE: 5100
BH BB DISPENSE STATUS: NORMAL
BH BB DISPENSE STATUS: NORMAL
BH BB PRODUCT CODE: NORMAL
BH BB PRODUCT CODE: NORMAL
BH BB UNIT NUMBER: NORMAL
BH BB UNIT NUMBER: NORMAL
CROSSMATCH INTERPRETATION: NORMAL
CROSSMATCH INTERPRETATION: NORMAL
UNIT  ABO: NORMAL
UNIT  ABO: NORMAL
UNIT  RH: NORMAL
UNIT  RH: NORMAL

## 2025-04-18 NOTE — PROGRESS NOTES
[facility] Cardiology  Subjective:     Encounter Date: 04/21/2025      Patient ID: Anali Hensley is a 89 y.o. female.    Chief Complaint: Heart Murmur and Hospital Follow Up Visit      PROBLEM LIST:  Heart murmur  Echo, 01/13/2025: EF 70%. Mild concentric hypertrophy. Severe aortic stenosis. Aortic pressure gradient: max (92 mmHg) mean (53 mmHg). Systolic anterior motion of anterior mitral leaflet. Moderate to severe MR. Moderate TR with RVSP of 35-45 mmHg.   Bronchiectasis   Aortic stenosis  GABE, 02/11/2025: EF 60%. Mild concentric hypertrophy. Severe aortic stenosis. Aortic valve area is 1 cm^2. Aortic valve annulus is 2.3 cm. Moderate MR. Moderate to severe MV stenosis with mean graident of 11-12 mmHg. Trace to mild TR with normal RVSP.   LHC, 02/11/2025: 50% LAD, 30% RCA.   Carotid duplex, 02/13/2025: CAMI/LICA <50% stenosis.   TAVR. 04/14/2025: Dr. Macdonald.   Echo, 04/15/2025: EF 75-80%. Borderline concentric hypertrophy. 20 mm CATHERINE 3 TAVR present. Aortic valve area: 1.5 cm2. Mean: 44 mmHg. Severe mitral valve stenosis. Mitral mean: 13 mmHg ay HR of 82 bpm. Mild-to-mod MR.   CHB  AV leadless pacemaker, 04/15/2025. Dr. Poole.   Anxiety/depression  Surgeries:  Hysterectomy       History of Present Illness  Anali Hensley returns today for a follow up with a history of aortic stenosis and cardiac risk factors. Since last visit, patient has been doing well overall from a cardiovascular standpoint. She is now s/p TAVR. She is planning to move [place] soon and is in the process of setting up cardiology care there. Her family mentions she has trouble with her appetite. She is starting rehab next week in [place]. She mentions feeling dizzy when she stands up. Patient denies chest pain, shortness of breath, orthopnea, palpitations, edema, and syncope.      Allergies   Allergen Reactions    Penicillins Swelling    Stevia [Stevioside] Swelling     PACK         Current  Outpatient Medications:     acetaminophen (Tylenol 8 Hour) 650 MG 8 hr tablet, Take 1 tablet by mouth Every 8 (Eight) Hours., Disp: , Rfl:     ascorbic acid with antonio hips 1000 MG tablet, Take 1 tablet by mouth Daily., Disp: , Rfl:     aspirin 81 MG EC tablet, Take 1 tablet by mouth Daily., Disp: , Rfl:     AZOPT 1 % ophthalmic suspension, Administer 1 drop to both eyes 2 (Two) Times a Day., Disp: , Rfl:     bimatoprost (LUMIGAN) 0.01 % ophthalmic drops, Administer 1 drop to both eyes Every Night., Disp: , Rfl:     brimonidine-timolol (Combigan) 0.2-0.5 % ophthalmic solution, Administer 1 drop to both eyes Every 12 (Twelve) Hours., Disp: , Rfl:     Diclofenac Sodium (VOLTAREN) 1 % gel gel, APPLY 2 GRAMS TO THE AFFECTED AREA(S) BY TOPICAL ROUTE 4 TIMES PER DAY (Patient taking differently: Apply 4 g topically to the appropriate area as directed As Needed (PRN).), Disp: , Rfl:     docusate sodium (Colace) 100 MG capsule, Take 1 capsule by mouth Daily., Disp: , Rfl:     donepezil (ARICEPT) 10 MG tablet, Take 1 tablet by mouth Daily., Disp: , Rfl:     LORazepam (ATIVAN) 1 MG tablet, Take  by mouth. (Patient taking differently: Take 0.5 tablets by mouth Every Night.), Disp: , Rfl:     mirtazapine (REMERON) 45 MG tablet, Take 1 tablet by mouth Every Night., Disp: , Rfl:     naproxen sodium (Aleve) 220 MG tablet, Every 12 (Twelve) Hours. (Patient taking differently: Take 1 tablet by mouth As Needed for Headache.), Disp: , Rfl:     Polyethylene Glycol 3350 (MIRALAX PO), Take 0.5 Capfuls by mouth Daily., Disp: , Rfl:     promethazine (PHENERGAN) 12.5 MG tablet, TAKE 1 OR 2 TABLETS EVERY 8 HOURS (Patient taking differently: Take 1 tablet by mouth As Needed for Nausea or Vomiting.), Disp: , Rfl:     spironolactone (ALDACTONE) 25 MG tablet, Take 1 tablet by mouth Daily., Disp: , Rfl:     zinc gluconate 50 MG tablet, Take 0.5 tablets by mouth Daily., Disp: , Rfl:   No current facility-administered medications for this  "visit.    Facility-Administered Medications Ordered in Other Visits:     Chlorhexidine Gluconate Cloth 2 % pads 1 Application, 1 Application, Topical, Q12H PRN, Franky Mcginnis PA    The following portions of the patient's history were reviewed and updated as appropriate: allergies, current medications, past family history, past medical history, past social history, past surgical history and problem list.    ROS       Objective:     Vitals:    04/21/25 1111   BP: 136/54   BP Location: Left arm   Patient Position: Sitting   Cuff Size: Adult   Pulse: 73   SpO2: 97%   Weight: 54.4 kg (120 lb)   Height: 152.4 cm (60\")         Vitals reviewed.   Constitutional:       Appearance: Well-developed and not in distress. Frail. Chronically ill-appearing.      Comments: In wheelchair   Neck:      Thyroid: No thyromegaly.      Vascular: No carotid bruit or JVD.   Pulmonary:      Breath sounds: Normal breath sounds.   Cardiovascular:      Regular rhythm.      Murmurs: There is a grade 2/6 systolic murmur at the URSB and LLSB.      No gallop. No S3 and S4 gallop.   Pulses:     Intact distal pulses.      Carotid: 2+ bilaterally.     Radial: 2+ bilaterally.  Edema:     Peripheral edema absent.   Abdominal:      General: Bowel sounds are normal.      Palpations: Abdomen is soft. There is no abdominal mass.      Tenderness: There is no abdominal tenderness.   Musculoskeletal:         General: No deformity.      Extremities: No clubbing present.     Comments: Bilateral femoral artery access sites benign.  No hematoma Skin:     General: Skin is warm and dry.      Findings: No rash.   Neurological:      Mental Status: Alert and oriented to person, place, and time.         Lab Review:  Lab Results   Component Value Date    GLUCOSE 141 (H) 04/16/2025    BUN 12 04/16/2025    CREATININE 0.53 (L) 04/16/2025    BCR 22.6 04/16/2025    K 4.0 04/16/2025    CO2 23.0 04/16/2025    CALCIUM 9.2 04/16/2025    ALBUMIN 4.0 04/11/2025    ALKPHOS 89 " "04/11/2025    AST 18 04/11/2025    ALT 10 04/11/2025     Lab Results   Component Value Date    CHOL 118 02/11/2025    TRIG 39 02/11/2025    HDL 49 02/11/2025    LDL 59 02/11/2025      Lab Results   Component Value Date    WBC 13.36 (H) 04/16/2025    RBC 3.92 04/16/2025    HGB 11.8 (L) 04/16/2025    HCT 36.2 04/16/2025    MCV 92.3 04/16/2025     04/16/2025     Lab Results   Component Value Date    HGBA1C 5.90 (H) 04/11/2025        Procedures      Advance Care Planning   ACP discussion was held with the patient during this visit. Patient has an advance directive in EMR which is still valid.            Assessment:   Diagnoses and all orders for this visit:    1. Aortic stenosis, severe s/p TAVR 4/14/25 (Primary)    2. Hypertension, unspecified type    3. Mitral valve insufficiency, unspecified etiology        Impression:  1. Aortic stenosis. s/p TAVR 4/14/25.  Doing well thus far    2. Hypertension. Controlled. Continue on spironolactone 25 mg daily for fluid retention and hypertension.     3. Mitral valve insufficiency. Stable. Continue to manage medically.  Currently euvolemic without heart failure    4.  AV block, complete status post TAVR.  Status post leadless pacemaker    Plan:  Stable cardiac status.  Patient to start cardiac rehab next week  Patient is moving to [place] for at least a year and perhaps longer.  She has a cardiologist visit set up for May when she moves there.  Will need \"baseline\" echocardiogram when she arrives in [place] next month..   Continue current medications.  Revisit PRN.        Scribed for Hudson Marley MD by Lily Booth. 4/21/2025  11:26 EDT  Hudson Marley MD      Please note that portions of this note may have been completed with a voice recognition program. Efforts were made to edit the dictations, but occasionally words are mistranscribed.    "

## 2025-04-21 ENCOUNTER — OFFICE VISIT (OUTPATIENT)
Dept: CARDIOLOGY | Facility: CLINIC | Age: 89
End: 2025-04-21
Payer: MEDICARE

## 2025-04-21 VITALS
WEIGHT: 120 LBS | DIASTOLIC BLOOD PRESSURE: 54 MMHG | HEIGHT: 60 IN | OXYGEN SATURATION: 97 % | BODY MASS INDEX: 23.56 KG/M2 | HEART RATE: 73 BPM | SYSTOLIC BLOOD PRESSURE: 136 MMHG

## 2025-04-21 DIAGNOSIS — I35.0 AORTIC STENOSIS, SEVERE: Primary | ICD-10-CM

## 2025-04-21 DIAGNOSIS — I10 HYPERTENSION, UNSPECIFIED TYPE: ICD-10-CM

## 2025-04-21 DIAGNOSIS — I34.0 MITRAL VALVE INSUFFICIENCY, UNSPECIFIED ETIOLOGY: ICD-10-CM

## 2025-04-21 LAB
AORTIC DIMENSIONLESS INDEX: 0.82 (DI)
AV MEAN PRESS GRAD SYS DOP V1V2: 8 MMHG
AV VMAX SYS DOP: 195.4 CM/SEC
BH CV ECHO MEAS - AO MAX PG: 15.3 MMHG
BH CV ECHO MEAS - AO V2 VTI: 52.1 CM
BH CV ECHO MEAS - AVA(I,D): 1.9 CM2
BH CV ECHO MEAS - LV MAX PG: 15.4 MMHG
BH CV ECHO MEAS - LV MEAN PG: 6.7 MMHG
BH CV ECHO MEAS - LV V1 MAX: 196.2 CM/SEC
BH CV ECHO MEAS - LV V1 VTI: 35.5 CM
BH CV ECHO MEAS - LVOT AREA: 2.6 CM2
BH CV ECHO MEAS - LVOT DIAM: 1.82 CM
BH CV ECHO MEAS - MV MEAN PG: 5 MMHG
BH CV ECHO MEAS - SV(LVOT): 92.6 ML
BH CV ECHO MEAS - SVI(LVOT): 62 ML/M2
LV EF 2D ECHO EST: 55 %

## 2025-04-21 PROCEDURE — 1159F MED LIST DOCD IN RCRD: CPT | Performed by: INTERNAL MEDICINE

## 2025-04-21 PROCEDURE — 99214 OFFICE O/P EST MOD 30 MIN: CPT | Performed by: INTERNAL MEDICINE

## 2025-04-21 PROCEDURE — 1160F RVW MEDS BY RX/DR IN RCRD: CPT | Performed by: INTERNAL MEDICINE

## 2025-04-23 LAB
ACT BLD: 101 SECONDS (ref 82–152)
ACT BLD: 377 SECONDS (ref 82–152)

## 2025-04-25 LAB
BASE EXCESS BLDA CALC-SCNC: -4 MMOL/L (ref -5–5)
CA-I BLDA-SCNC: 1.2 MMOL/L (ref 1.2–1.32)
CO2 BLDA-SCNC: 22 MMOL/L (ref 24–29)
GLUCOSE BLDC GLUCOMTR-MCNC: 88 MG/DL (ref 70–130)
HCO3 BLDA-SCNC: 21.2 MMOL/L (ref 22–26)
HCT VFR BLDA CALC: 32 % (ref 38–51)
HGB BLDA-MCNC: 10.9 G/DL (ref 12–17)
PCO2 BLDA: 34.5 MM HG (ref 35–45)
PH BLDA: 7.4 PH UNITS (ref 7.35–7.6)
PO2 BLDA: 86 MMHG (ref 80–105)
POTASSIUM BLDA-SCNC: 3.7 MMOL/L (ref 3.5–4.9)
SAO2 % BLDA: 97 % (ref 95–98)
SODIUM BLD-SCNC: 138 MMOL/L (ref 138–146)

## 2025-04-28 ENCOUNTER — READMISSION MANAGEMENT (OUTPATIENT)
Dept: CALL CENTER | Facility: HOSPITAL | Age: 89
End: 2025-04-28
Payer: MEDICARE

## 2025-04-28 NOTE — OUTREACH NOTE
CT Surgery Week 2 Survey      Flowsheet Row Responses   [facility] facility patient discharged from? [place]   Does the patient have one of the following disease processes/diagnoses(primary or secondary)? Cardiothoracic surgery   Week 2 attempt successful? No   Unsuccessful attempts Attempt 1   Revoke Sonny TEE - Registered Nurse

## 2025-05-05 ENCOUNTER — TELEPHONE (OUTPATIENT)
Dept: CARDIAC SURGERY | Facility: CLINIC | Age: 89
End: 2025-05-05
Payer: MEDICARE

## 2025-05-05 NOTE — TELEPHONE ENCOUNTER
I spoke with daughterAleida regarding cancelled appt with LC Vargas on 5/6/25. Patient is scheduled to travel to [place] with daughter on 5/8/25. Unfortunately there was no availability to reschedule post op before she left [facility]. She will not be returning to [place] until summer 2026. She already has been in contact with cardiologist in [facility] to follow. Discussed with LC Moore- advised that if patient has seen cardiologist post op and has follow-up with new cardiologist in [facility], patient will not need follow-up with CT Surgery.

## 2025-05-14 ENCOUNTER — TELEPHONE (OUTPATIENT)
Dept: CARDIOLOGY | Facility: CLINIC | Age: 89
End: 2025-05-14
Payer: MEDICARE

## 2025-05-14 NOTE — TELEPHONE ENCOUNTER
Spoke with daughter to remind her that her mother's pacemaker reading is due today. Patient is currently in [place] but has brought the monitor with her. Daughter told me that they are on their way to her cardiologist in [place] but will send in the reading when they return home.

## 2025-05-21 ENCOUNTER — TELEPHONE (OUTPATIENT)
Dept: CARDIOLOGY | Facility: HOSPITAL | Age: 89
End: 2025-05-21
Payer: MEDICARE

## 2025-05-21 NOTE — TELEPHONE ENCOUNTER
Spoke to patient daughter, patient is now in [place], [place], she has established care with [facility]. She had visit with Dr. Artur Alonso. Their office number is [phone]. Also completed K[facility]Q-12

## (undated) DEVICE — SENSR CERBRL O2 PK/2

## (undated) DEVICE — GW XCHG AMPLTZ XSTIF PTFE CRV .035IN 3X180CM

## (undated) DEVICE — SLV REPOSTNG CATH STRL 60CM

## (undated) DEVICE — DECANT BG O JET

## (undated) DEVICE — PERCLOSE™ PROSTYLE™ SUTURE-MEDIATED CLOSURE AND REPAIR SYSTEM: Brand: PERCLOSE™ PROSTYLE™

## (undated) DEVICE — HDRST POSTIN FM CRDL TRACH SLOT NONCOMRESS 9X8X4IN

## (undated) DEVICE — APPL CHLORAPREP TINTED 26ML TEAL

## (undated) DEVICE — CATH DIAG EXPO .056 FL3.5 6F 100CM

## (undated) DEVICE — LHK- LEFT HEART KIT BAPTIST: Brand: MEDLINE INDUSTRIES, INC.

## (undated) DEVICE — SUT SILK 0 SH 30IN K834H

## (undated) DEVICE — PINNACLE INTRODUCER SHEATH: Brand: PINNACLE

## (undated) DEVICE — SUT SILK 4/0 TIES 18IN A183H

## (undated) DEVICE — DIL VESL .038 16F 19CM

## (undated) DEVICE — PENCL SMOKE/EVAC MEGADYNE TELESCP 10FT

## (undated) DEVICE — CATH PACE PACEL BIPOL 5F110CM

## (undated) DEVICE — Device: Brand: MEDEX

## (undated) DEVICE — ADULT, W/LG. BACK PAD, RADIOTRANSPARENT ELEMENT AND LEAD WIRE COMPATIBLE W/: Brand: DEFIBRILLATION ELECTRODES

## (undated) DEVICE — ST INF PRI SMRTSTE 20DRP 2VLV 24ML 117

## (undated) DEVICE — PK CATH CARD 10

## (undated) DEVICE — 3M™ STERI-DRAPE™ FLUOROSCOPE DRAPE, 10 PER CARTON / 4 CARTONS PER CASE, 1012: Brand: STERI-DRAPE™

## (undated) DEVICE — NDL ANGIOGR ADV THN SMOTH SGLWALL 21G 1.5

## (undated) DEVICE — MODEL AT P65, P/N 701554-001KIT CONTENTS: HAND CONTROLLER, 3-WAY HIGH-PRESSURE STOPCOCK WITH ROTATING END AND PREMIUM HIGH-PRESSURE TUBING: Brand: ANGIOTOUCH® KIT

## (undated) DEVICE — INFLATION DEVICE: Brand: ENCORE™ 26

## (undated) DEVICE — GW PERIPH GUIDERIGHT STD/EXCHNG/J/TIP SS 0.035IN 5X260CM

## (undated) DEVICE — SHROD PENCL MEGADYNE ATTACHAVAC W/CONN/22MM

## (undated) DEVICE — NDL PERC 1PRT THNWALL W/BASEPLT 18G 7CM

## (undated) DEVICE — GLIDESHEATH BASIC HYDROPHILIC COATED INTRODUCER SHEATH: Brand: GLIDESHEATH

## (undated) DEVICE — Device

## (undated) DEVICE — TR BAND RADIAL ARTERY COMPRESSION DEVICE: Brand: TR BAND

## (undated) DEVICE — SPNG GZ WOVN 4X4IN 12PLY 10/BX STRL

## (undated) DEVICE — ELECTRD DEFIB M/FUNC PROPADZ STRL 2PK

## (undated) DEVICE — GOWN,SIRUS,NONRNF,SETINSLV,XL,20/CS: Brand: MEDLINE

## (undated) DEVICE — PATIENT RETURN ELECTRODE, SINGLE-USE, CONTACT QUALITY MONITORING, ADULT, WITH 9FT CORD, FOR PATIENTS WEIGING OVER 33LBS. (15KG): Brand: MEGADYNE

## (undated) DEVICE — SUT SILK 3/0 TIES 18IN A184H

## (undated) DEVICE — DRSNG SURESITE WNDW 4X4.5

## (undated) DEVICE — INTENDED FOR TISSUE SEPARATION, AND OTHER PROCEDURES THAT REQUIRE A SHARP SURGICAL BLADE TO PUNCTURE OR CUT.: Brand: BARD-PARKER ® STAINLESS STEEL BLADES

## (undated) DEVICE — LIMB HOLDER, WRIST/ANKLE: Brand: DEROYAL

## (undated) DEVICE — ATLAS® GOLD PTA DILATATION CATHETER 16 MM X 40 MM, 120 CM CATHETER: Brand: ATLAS® GOLD

## (undated) DEVICE — GLV SURG BIOGELULTRATOUCH POLYISPRN PF LF SZ7 STRL

## (undated) DEVICE — SYR LUERLOK 50ML

## (undated) DEVICE — MICRO HVTSA, 0.5G AND HVTSA SOURCEMARK PRODUCT CODE M1206 AND M1206-01: Brand: EXOFIN MICRO HVTSA, 0.5G

## (undated) DEVICE — LEX TAVR: Brand: MEDLINE INDUSTRIES, INC.

## (undated) DEVICE — CATH GUIDE BERN 5F 65CM

## (undated) DEVICE — SUCTION CANISTER 2500CC: Brand: DEROYAL

## (undated) DEVICE — SHEATH INTRO MICRA HC 23F 55.7CM

## (undated) DEVICE — CATH DIAG EXPO M/ PK 6FR FL4/FR4 PIG 3PK

## (undated) DEVICE — LN INJ CONTRST FLXCIL HP F/M LL 1200PSI48

## (undated) DEVICE — CABL PACE ATRIAL PT BLU

## (undated) DEVICE — SYR LUERLOK 30CC

## (undated) DEVICE — TRAP FLD MINIVAC MEGADYNE 100ML

## (undated) DEVICE — MODEL BT2000 P/N 700287-012KIT CONTENTS: MANIFOLD WITH SALINE AND CONTRAST PORTS, SALINE TUBING WITH SPIKE AND HAND SYRINGE, TRANSDUCER: Brand: BT2000 AUTOMATED MANIFOLD KIT

## (undated) DEVICE — SUT SILK 2/0 TIES 18IN A185H

## (undated) DEVICE — PAD,ARMBOARD,CONV,FOAM,2X8X20",12PR/CS: Brand: MEDLINE

## (undated) DEVICE — DECANTER BAG 9": Brand: MEDLINE INDUSTRIES, INC.

## (undated) DEVICE — INTRO SHEATH ENGAGE W/50 GW .038 8F12

## (undated) DEVICE — ANGIOGRAPHIC CATHETER: Brand: EXPO™

## (undated) DEVICE — CANN NASL CAPNOFLEX SMPL CO2/O2 W/O2/DEL A/

## (undated) DEVICE — SI AVANTI+ 7F STD W/GW  NO OBT: Brand: AVANTI

## (undated) DEVICE — DEV INFL QL LK 38ML

## (undated) DEVICE — LEX ELECTRO PHYSIOLOGY: Brand: MEDLINE INDUSTRIES, INC.

## (undated) DEVICE — BLANKT WARM UNDER/BDY A/ 100X195CM DISP LF

## (undated) DEVICE — LUBE JELLY SURGILUBE TB/FLIPCAP 4.5OZ

## (undated) DEVICE — DIL VESL .038 12F 19CM

## (undated) DEVICE — PK ATS CUST W CARDIOTOMY RESEVOIR

## (undated) DEVICE — RADIFOCUS GLIDEWIRE ADVANTAGE GUIDEWIRE: Brand: GLIDEWIRE ADVANTAGE

## (undated) DEVICE — GW CERBRL FC PTFE STD/STR .035 260CM

## (undated) DEVICE — GW PERIPH VASC ADX J/TP SS .035 150CM 3MM

## (undated) DEVICE — ST EXT IV SMRTSTE 2VLV FIX M LL 6ML 41

## (undated) DEVICE — CATH DIAG EXPO .056 AL1 6F 100CM

## (undated) DEVICE — GW SAFARI2 PRESH XSM CRV .035IN 3.2X2.9X275CM